# Patient Record
Sex: MALE | Race: OTHER | Employment: UNEMPLOYED
[De-identification: names, ages, dates, MRNs, and addresses within clinical notes are randomized per-mention and may not be internally consistent; named-entity substitution may affect disease eponyms.]

---

## 2017-01-25 ENCOUNTER — CARE COORDINATION (OUTPATIENT)
Dept: INTERNAL MEDICINE | Facility: CLINIC | Age: 77
End: 2017-01-25

## 2017-01-26 ENCOUNTER — CARE COORDINATION (OUTPATIENT)
Dept: CARE COORDINATION | Facility: CLINIC | Age: 77
End: 2017-01-26

## 2017-01-26 RX ORDER — AMIODARONE HYDROCHLORIDE 200 MG/1
200 TABLET ORAL DAILY
COMMUNITY
Start: 2017-01-25 | End: 2017-02-13 | Stop reason: SDUPTHER

## 2017-01-26 RX ORDER — ATORVASTATIN CALCIUM 20 MG/1
20 TABLET, FILM COATED ORAL DAILY
COMMUNITY
Start: 2017-01-25 | End: 2017-02-13 | Stop reason: SDUPTHER

## 2017-01-26 RX ORDER — POTASSIUM CHLORIDE 20 MEQ/1
20 TABLET, EXTENDED RELEASE ORAL DAILY
COMMUNITY
Start: 2017-01-25 | End: 2017-03-27 | Stop reason: SDUPTHER

## 2017-01-26 RX ORDER — FUROSEMIDE 40 MG/1
40 TABLET ORAL DAILY
COMMUNITY
Start: 2017-01-25 | End: 2017-02-22 | Stop reason: SDUPTHER

## 2017-01-26 RX ORDER — WARFARIN SODIUM 5 MG/1
5 TABLET ORAL DAILY
COMMUNITY

## 2017-01-26 RX ORDER — METOPROLOL TARTRATE 50 MG/1
50 TABLET, FILM COATED ORAL 2 TIMES DAILY
COMMUNITY
Start: 2017-01-25 | End: 2017-01-31 | Stop reason: DRUGHIGH

## 2017-01-26 RX ORDER — FAMOTIDINE 20 MG/1
20 TABLET, FILM COATED ORAL 2 TIMES DAILY
COMMUNITY
Start: 2017-01-25 | End: 2017-03-11 | Stop reason: SDUPTHER

## 2017-01-31 ENCOUNTER — OFFICE VISIT (OUTPATIENT)
Dept: INTERNAL MEDICINE | Facility: CLINIC | Age: 77
End: 2017-01-31

## 2017-01-31 VITALS
BODY MASS INDEX: 26.68 KG/M2 | WEIGHT: 170 LBS | DIASTOLIC BLOOD PRESSURE: 70 MMHG | SYSTOLIC BLOOD PRESSURE: 130 MMHG | RESPIRATION RATE: 14 BRPM | HEIGHT: 67 IN

## 2017-01-31 DIAGNOSIS — Z95.2 H/O AORTIC VALVE REPLACEMENT: ICD-10-CM

## 2017-01-31 DIAGNOSIS — E11.9 TYPE 2 DIABETES MELLITUS WITHOUT COMPLICATION, WITHOUT LONG-TERM CURRENT USE OF INSULIN (HCC): Primary | Chronic | ICD-10-CM

## 2017-01-31 DIAGNOSIS — E78.2 MIXED HYPERLIPIDEMIA: Chronic | ICD-10-CM

## 2017-01-31 DIAGNOSIS — Z95.1 S/P CABG X 3: ICD-10-CM

## 2017-01-31 PROCEDURE — 99496 TRANSJ CARE MGMT HIGH F2F 7D: CPT | Performed by: INTERNAL MEDICINE

## 2017-01-31 RX ORDER — POTASSIUM CHLORIDE 750 MG/1
TABLET, EXTENDED RELEASE ORAL
COMMUNITY
Start: 2017-01-13 | End: 2018-02-22 | Stop reason: SDUPTHER

## 2017-01-31 ASSESSMENT — ENCOUNTER SYMPTOMS
RESPIRATORY NEGATIVE: 1
SHORTNESS OF BREATH: 0
EYES NEGATIVE: 1
ALLERGIC/IMMUNOLOGIC NEGATIVE: 1
CHEST TIGHTNESS: 0

## 2017-02-13 RX ORDER — AMIODARONE HYDROCHLORIDE 200 MG/1
200 TABLET ORAL DAILY
Qty: 30 TABLET | Refills: 6 | Status: SHIPPED | OUTPATIENT
Start: 2017-02-13

## 2017-02-13 RX ORDER — ATORVASTATIN CALCIUM 20 MG/1
20 TABLET, FILM COATED ORAL DAILY
Qty: 30 TABLET | Refills: 6 | Status: SHIPPED | OUTPATIENT
Start: 2017-02-13

## 2017-02-22 RX ORDER — FUROSEMIDE 40 MG/1
TABLET ORAL
Qty: 30 TABLET | Refills: 0 | Status: SHIPPED | OUTPATIENT
Start: 2017-02-22 | End: 2017-03-19 | Stop reason: SDUPTHER

## 2017-03-13 RX ORDER — FAMOTIDINE 20 MG/1
TABLET, FILM COATED ORAL
Qty: 42 TABLET | Refills: 0 | Status: SHIPPED | OUTPATIENT
Start: 2017-03-13 | End: 2017-03-26 | Stop reason: SDUPTHER

## 2017-03-20 RX ORDER — FUROSEMIDE 40 MG/1
TABLET ORAL
Qty: 30 TABLET | Refills: 0 | Status: SHIPPED | OUTPATIENT
Start: 2017-03-20 | End: 2017-04-22 | Stop reason: SDUPTHER

## 2017-03-27 ENCOUNTER — OFFICE VISIT (OUTPATIENT)
Dept: INTERNAL MEDICINE CLINIC | Age: 77
End: 2017-03-27
Payer: COMMERCIAL

## 2017-03-27 VITALS
SYSTOLIC BLOOD PRESSURE: 130 MMHG | HEIGHT: 67 IN | RESPIRATION RATE: 14 BRPM | WEIGHT: 179 LBS | DIASTOLIC BLOOD PRESSURE: 68 MMHG | BODY MASS INDEX: 28.09 KG/M2

## 2017-03-27 DIAGNOSIS — Z88.8 ALLERGY TO ACE INHIBITORS: ICD-10-CM

## 2017-03-27 DIAGNOSIS — Z95.1 S/P CABG X 3: Primary | ICD-10-CM

## 2017-03-27 DIAGNOSIS — Z95.2 H/O AORTIC VALVE REPLACEMENT: ICD-10-CM

## 2017-03-27 DIAGNOSIS — E11.9 TYPE 2 DIABETES MELLITUS WITHOUT COMPLICATION, WITHOUT LONG-TERM CURRENT USE OF INSULIN (HCC): Chronic | ICD-10-CM

## 2017-03-27 DIAGNOSIS — R79.9 ABNORMAL FINDING OF BLOOD CHEMISTRY: ICD-10-CM

## 2017-03-27 PROCEDURE — 99214 OFFICE O/P EST MOD 30 MIN: CPT | Performed by: INTERNAL MEDICINE

## 2017-03-27 RX ORDER — POTASSIUM CHLORIDE 20 MEQ/1
20 TABLET, EXTENDED RELEASE ORAL DAILY
Qty: 60 TABLET | Refills: 3 | Status: SHIPPED | OUTPATIENT
Start: 2017-03-27 | End: 2018-02-22 | Stop reason: SDUPTHER

## 2017-03-27 RX ORDER — FAMOTIDINE 20 MG/1
TABLET, FILM COATED ORAL
Qty: 42 TABLET | Refills: 0 | Status: SHIPPED | OUTPATIENT
Start: 2017-03-27 | End: 2017-04-22 | Stop reason: SDUPTHER

## 2017-03-27 ASSESSMENT — ENCOUNTER SYMPTOMS
RESPIRATORY NEGATIVE: 1
ALLERGIC/IMMUNOLOGIC NEGATIVE: 1
CHEST TIGHTNESS: 0
EYES NEGATIVE: 1
SHORTNESS OF BREATH: 0

## 2017-04-03 LAB
ALBUMIN SERPL-MCNC: 4.4 G/DL
ALP BLD-CCNC: 53 U/L
ALT SERPL-CCNC: 18 U/L
AST SERPL-CCNC: 18 U/L
BASOPHILS ABSOLUTE: ABNORMAL /ΜL
BASOPHILS RELATIVE PERCENT: ABNORMAL %
BILIRUB SERPL-MCNC: 0.3 MG/DL (ref 0.1–1.4)
BUN BLDV-MCNC: 21 MG/DL
CALCIUM SERPL-MCNC: 9.9 MG/DL
CHLORIDE BLD-SCNC: 98 MMOL/L
CO2: 29 MMOL/L
CREAT SERPL-MCNC: 1.05 MG/DL
EOSINOPHILS ABSOLUTE: ABNORMAL /ΜL
EOSINOPHILS RELATIVE PERCENT: ABNORMAL %
GFR CALCULATED: NORMAL
GLUCOSE BLD-MCNC: 152 MG/DL
HBA1C MFR BLD: 6.4 %
HCT VFR BLD CALC: 39 % (ref 41–53)
HEMOGLOBIN: 13.2 G/DL (ref 13.5–17.5)
LYMPHOCYTES ABSOLUTE: ABNORMAL /ΜL
LYMPHOCYTES RELATIVE PERCENT: ABNORMAL %
MCH RBC QN AUTO: 29.2 PG
MCHC RBC AUTO-ENTMCNC: 33.9 G/DL
MCV RBC AUTO: 86 FL
MONOCYTES ABSOLUTE: ABNORMAL /ΜL
MONOCYTES RELATIVE PERCENT: ABNORMAL %
NEUTROPHILS ABSOLUTE: ABNORMAL /ΜL
NEUTROPHILS RELATIVE PERCENT: ABNORMAL %
PDW BLD-RTO: 15.6 %
PLATELET # BLD: 259 K/ΜL
PMV BLD AUTO: 9 FL
POTASSIUM SERPL-SCNC: 5.2 MMOL/L
RBC # BLD: 4.53 10^6/ΜL
SODIUM BLD-SCNC: 138 MMOL/L
TOTAL PROTEIN: 6.8
WBC # BLD: 8.6 10^3/ML

## 2017-04-06 DIAGNOSIS — E11.9 TYPE 2 DIABETES MELLITUS WITHOUT COMPLICATION, WITHOUT LONG-TERM CURRENT USE OF INSULIN (HCC): Chronic | ICD-10-CM

## 2017-04-06 DIAGNOSIS — R79.9 ABNORMAL FINDING OF BLOOD CHEMISTRY: ICD-10-CM

## 2017-04-06 DIAGNOSIS — Z95.1 S/P CABG X 3: ICD-10-CM

## 2017-04-24 RX ORDER — FAMOTIDINE 20 MG/1
TABLET, FILM COATED ORAL
Qty: 42 TABLET | Refills: 3 | Status: SHIPPED | OUTPATIENT
Start: 2017-04-24 | End: 2017-07-09 | Stop reason: SDUPTHER

## 2017-04-24 RX ORDER — FUROSEMIDE 40 MG/1
TABLET ORAL
Qty: 30 TABLET | Refills: 3 | Status: SHIPPED | OUTPATIENT
Start: 2017-04-24

## 2017-06-27 ENCOUNTER — TELEPHONE (OUTPATIENT)
Dept: INTERNAL MEDICINE CLINIC | Age: 77
End: 2017-06-27

## 2017-07-10 RX ORDER — FAMOTIDINE 20 MG/1
TABLET, FILM COATED ORAL
Qty: 14 TABLET | Refills: 0 | Status: SHIPPED | OUTPATIENT
Start: 2017-07-10

## 2018-02-07 ENCOUNTER — OFFICE VISIT (OUTPATIENT)
Dept: PODIATRY | Age: 78
End: 2018-02-07
Payer: COMMERCIAL

## 2018-02-07 VITALS
WEIGHT: 192 LBS | DIASTOLIC BLOOD PRESSURE: 85 MMHG | HEART RATE: 75 BPM | SYSTOLIC BLOOD PRESSURE: 138 MMHG | HEIGHT: 67 IN | BODY MASS INDEX: 30.13 KG/M2

## 2018-02-07 DIAGNOSIS — M77.52 LEFT CALCANEAL BURSITIS: Primary | ICD-10-CM

## 2018-02-07 DIAGNOSIS — M79.672 PAIN IN LEFT FOOT: ICD-10-CM

## 2018-02-07 DIAGNOSIS — M72.2 PLANTAR FASCIITIS OF LEFT FOOT: ICD-10-CM

## 2018-02-07 PROCEDURE — 99203 OFFICE O/P NEW LOW 30 MIN: CPT | Performed by: PODIATRIST

## 2018-02-07 RX ORDER — FUROSEMIDE 40 MG/1
TABLET ORAL
COMMUNITY
Start: 2018-01-07 | End: 2018-02-22 | Stop reason: SDUPTHER

## 2018-02-07 RX ORDER — FLUTICASONE PROPIONATE 50 MCG
SPRAY, SUSPENSION (ML) NASAL
COMMUNITY
Start: 2018-01-02

## 2018-02-07 RX ORDER — ATORVASTATIN CALCIUM 20 MG/1
TABLET, FILM COATED ORAL
COMMUNITY
Start: 2018-02-03 | End: 2018-02-22 | Stop reason: SDUPTHER

## 2018-02-07 RX ORDER — POLYETHYLENE GLYCOL 3350 17 G/17G
POWDER, FOR SOLUTION ORAL
COMMUNITY
Start: 2018-01-24

## 2018-02-07 RX ORDER — BACLOFEN 10 MG/1
10 TABLET ORAL 3 TIMES DAILY
COMMUNITY

## 2018-02-07 RX ORDER — FAMOTIDINE 20 MG/1
TABLET, FILM COATED ORAL
COMMUNITY
Start: 2018-01-14 | End: 2018-02-22 | Stop reason: SDUPTHER

## 2018-02-07 ASSESSMENT — ENCOUNTER SYMPTOMS
SHORTNESS OF BREATH: 0
COLOR CHANGE: 0
BACK PAIN: 0
DIARRHEA: 0
NAUSEA: 0

## 2018-02-07 NOTE — PROGRESS NOTES
warm, supple, and dry. Vascular: DP pulse of the right foot is  palpable. DP pulse of the left foot is  palpable. PT pulse of the right foot is  palpable. PT pulse of the left foot is  palpable. CFT is less than 3 secs to the digits of the right foot. CFT is less than 3 secs to the digits of the left foot. There is no edema noted to the bilateral foot or ankle. There is hair growth noted to the digits of the bilateral feet. There are varicosities noted to the right foot/ankle. There are varicosities noted to the left foot/ankle. Erythema is absent to the bilateral feet. Neurological: Reflexes are no present to the right plantar foot and to the Achilles tendon. Reflexes are no present to the left plantar foot and to the Achilles tendon. Protective sensation is present to the right plantar foot as noted with a 5.07 Damar-Caryl monofilament. Protective sensation is present to the left plantar foot as noted with a 5.07 Damar-Caryl monofilament. Musculoskeletal:  Muscle strength is +5/5 to all four muscle groups of the right lower extremity and +5/5 to all four muscle groups of the left lower extremity. There are no areas of subluxation, dislocation, or laxity noted to either lower extremity. Range of motion to the right ankle is  free of pain or grinding. Range of motion to the left ankle is  free of pain or grinding. Range of motion to the right subtalar joint is  free of pain or grinding. Range of motion to the left subtalar joint is  free of pain or grinding. No abnormalities, asymmetries, or misalignments are seen between the extremities. Weightbearing evaluation does not reveal rearfoot eversion, medial prominence of the talar head, loss of the medial longitudinal arch height, and too many toes sign bilaterally. The digits of the right foot are contracted. The digits of the left foot are contracted.      There is no prominence noted to the first metatarsal head without abduction of the hallux of the right foot. There is no prominence noted to the first metatarsal head without abduction of the hallux of the left foot. There is no pain with palpation to the plantar medial calcaneal tubercle of the left foot. There is only slight pain with palpation to the plantar central aspect of the left heel. There is no pain with palpation to the selma pedis of the left foot. There is no pain with medial to lateral compression of the left calcaneus. Tinel's sign is negative to the left tarsal tunnel. There is no erythema, calor, or open lesion noted to the left foot or ankle. Shoe examination was performed. Biomechanical Exam: normal bilaterally. X-ray's reviewed from the hospital: Lateral and calcaneal axial of the left foot. Findings: There is no fracture or stress fracture noted to the left calcaneus. There is a small bone spur noted to the plantar aspect of the calcaneus on the lateral view. Asessment: Patient is a 69 y.o. male with:   1. Left calcaneal bursitis     2. Plantar fasciitis of left foot     3. Pain in left foot         Plan:  1. Clinical evaluation of the patient. 2. Patient instructed on proper icing and stretching of the left foot. Patient advised to obtain over-the-counter gel heel inserts. Patient informed that if these modalities do not reduce his pain, then I would recommend a steroid injection. Patient states that he understands this. 3. Contact office with any questions/problems/concerns. Return in about 2 weeks (around 2/21/2018) for Evaluation of plantar fasciitis.    2/7/2018      Jesse Salmeron DPM

## 2018-02-22 ENCOUNTER — OFFICE VISIT (OUTPATIENT)
Dept: PODIATRY | Age: 78
End: 2018-02-22
Payer: COMMERCIAL

## 2018-02-22 VITALS
SYSTOLIC BLOOD PRESSURE: 142 MMHG | HEIGHT: 66 IN | BODY MASS INDEX: 30.53 KG/M2 | HEART RATE: 64 BPM | WEIGHT: 190 LBS | DIASTOLIC BLOOD PRESSURE: 83 MMHG

## 2018-02-22 DIAGNOSIS — M1A.0720 CHRONIC IDIOPATHIC GOUT INVOLVING TOE OF LEFT FOOT WITHOUT TOPHUS: ICD-10-CM

## 2018-02-22 DIAGNOSIS — M77.52 LEFT CALCANEAL BURSITIS: Primary | ICD-10-CM

## 2018-02-22 DIAGNOSIS — M79.672 PAIN IN LEFT FOOT: ICD-10-CM

## 2018-02-22 DIAGNOSIS — M72.2 PLANTAR FASCIITIS OF LEFT FOOT: ICD-10-CM

## 2018-02-22 PROCEDURE — 99213 OFFICE O/P EST LOW 20 MIN: CPT | Performed by: PODIATRIST

## 2018-02-22 ASSESSMENT — ENCOUNTER SYMPTOMS
SHORTNESS OF BREATH: 0
DIARRHEA: 0
COLOR CHANGE: 0
BACK PAIN: 0
NAUSEA: 0

## 2018-02-23 LAB — URIC ACID: 7.7

## 2018-03-05 ENCOUNTER — TELEPHONE (OUTPATIENT)
Dept: PODIATRY | Age: 78
End: 2018-03-05

## 2018-07-05 RX ORDER — POTASSIUM CHLORIDE 1500 MG/1
TABLET, EXTENDED RELEASE ORAL
Qty: 60 TABLET | Refills: 3 | OUTPATIENT
Start: 2018-07-05

## 2018-09-17 RX ORDER — POTASSIUM CHLORIDE 1500 MG/1
TABLET, EXTENDED RELEASE ORAL
Qty: 30 TABLET | Refills: 0 | OUTPATIENT
Start: 2018-09-17

## 2021-03-31 DIAGNOSIS — M25.562 CHRONIC PAIN OF BOTH KNEES: Primary | ICD-10-CM

## 2021-03-31 DIAGNOSIS — M25.561 CHRONIC PAIN OF BOTH KNEES: Primary | ICD-10-CM

## 2021-03-31 DIAGNOSIS — G89.29 CHRONIC PAIN OF BOTH KNEES: Primary | ICD-10-CM

## 2021-04-01 ENCOUNTER — OFFICE VISIT (OUTPATIENT)
Dept: ORTHOPEDIC SURGERY | Age: 81
End: 2021-04-01
Payer: COMMERCIAL

## 2021-04-01 VITALS — TEMPERATURE: 98 F

## 2021-04-01 DIAGNOSIS — M17.0 PRIMARY OSTEOARTHRITIS OF BOTH KNEES: ICD-10-CM

## 2021-04-01 DIAGNOSIS — M25.561 CHRONIC PAIN OF BOTH KNEES: Primary | ICD-10-CM

## 2021-04-01 DIAGNOSIS — G89.29 CHRONIC PAIN OF BOTH KNEES: Primary | ICD-10-CM

## 2021-04-01 DIAGNOSIS — M25.562 CHRONIC PAIN OF BOTH KNEES: Primary | ICD-10-CM

## 2021-04-01 PROCEDURE — 99203 OFFICE O/P NEW LOW 30 MIN: CPT | Performed by: ORTHOPAEDIC SURGERY

## 2021-04-01 PROCEDURE — 20610 DRAIN/INJ JOINT/BURSA W/O US: CPT | Performed by: ORTHOPAEDIC SURGERY

## 2021-04-01 RX ORDER — BETAMETHASONE SODIUM PHOSPHATE AND BETAMETHASONE ACETATE 3; 3 MG/ML; MG/ML
12 INJECTION, SUSPENSION INTRA-ARTICULAR; INTRALESIONAL; INTRAMUSCULAR; SOFT TISSUE ONCE
Status: COMPLETED | OUTPATIENT
Start: 2021-04-01 | End: 2021-04-01

## 2021-04-01 RX ORDER — LIDOCAINE HYDROCHLORIDE 10 MG/ML
2 INJECTION, SOLUTION INFILTRATION; PERINEURAL ONCE
Status: COMPLETED | OUTPATIENT
Start: 2021-04-01 | End: 2021-04-01

## 2021-04-01 RX ORDER — BUPIVACAINE HYDROCHLORIDE 5 MG/ML
2 INJECTION, SOLUTION PERINEURAL ONCE
Status: COMPLETED | OUTPATIENT
Start: 2021-04-01 | End: 2021-04-01

## 2021-04-01 RX ADMIN — BETAMETHASONE SODIUM PHOSPHATE AND BETAMETHASONE ACETATE 12 MG: 3; 3 INJECTION, SUSPENSION INTRA-ARTICULAR; INTRALESIONAL; INTRAMUSCULAR; SOFT TISSUE at 15:49

## 2021-04-01 RX ADMIN — BUPIVACAINE HYDROCHLORIDE 10 MG: 5 INJECTION, SOLUTION PERINEURAL at 15:50

## 2021-04-01 RX ADMIN — LIDOCAINE HYDROCHLORIDE 2 ML: 10 INJECTION, SOLUTION INFILTRATION; PERINEURAL at 15:50

## 2021-04-01 NOTE — PROGRESS NOTES
Patient ID: Valente Gates is a [de-identified] y.o. male    Chief Compliant:  No chief complaint on file. HPI:  This is a [de-identified] y.o. male who presents to the clinic today for bilateral knee evaluation. He was seen prior by Dr. Meaghan Francisco and was scheduled to have knee arthroplasty until he had heart problems    He reports significant knee pain bilaterally, worse on the right. He notes prior steroid injections in his knees provided some relief. He is diabetic. But only on oral medications. Review of Systems   All other systems reviewed and are negative. Past History:    Current Outpatient Medications:     fluticasone (FLONASE) 50 MCG/ACT nasal spray, , Disp: , Rfl:     polyethylene glycol (GLYCOLAX) powder, , Disp: , Rfl:     baclofen (LIORESAL) 10 MG tablet, Take 10 mg by mouth 3 times daily, Disp: , Rfl:     famotidine (PEPCID) 20 MG tablet, TAKE ONE TABLET BY MOUTH TWICE DAILY, Disp: 14 tablet, Rfl: 0    furosemide (LASIX) 40 MG tablet, TAKE ONE TABLET BY MOUTH ONCE DAILY, Disp: 30 tablet, Rfl: 3    atorvastatin (LIPITOR) 20 MG tablet, Take 1 tablet by mouth daily, Disp: 30 tablet, Rfl: 6    amiodarone (CORDARONE) 200 MG tablet, Take 1 tablet by mouth daily, Disp: 30 tablet, Rfl: 6    metoprolol tartrate (LOPRESSOR) 25 MG tablet, , Disp: , Rfl:     warfarin (COUMADIN) 5 MG tablet, Take 5 mg by mouth daily, Disp: , Rfl:     metFORMIN (GLUCOPHAGE) 500 MG tablet, Take 1 tablet by mouth 2 times daily (with meals), Disp: 180 tablet, Rfl: 3    aspirin 81 MG tablet, Take 1 tablet by mouth daily (with breakfast). , Disp: 90 tablet, Rfl: 3  Allergies   Allergen Reactions    Lisinopril Swelling    Lisinopril Swelling     Social History     Socioeconomic History    Marital status: Unknown     Spouse name: Not on file    Number of children: Not on file    Years of education: Not on file    Highest education level: Not on file   Occupational History    Not on file   Social Needs    Financial resource strain: Not on file    Food insecurity     Worry: Not on file     Inability: Not on file    Transportation needs     Medical: Not on file     Non-medical: Not on file   Tobacco Use    Smoking status: Former Smoker    Smokeless tobacco: Never Used   Substance and Sexual Activity    Alcohol use: No    Drug use: No    Sexual activity: Not on file   Lifestyle    Physical activity     Days per week: Not on file     Minutes per session: Not on file    Stress: Not on file   Relationships    Social connections     Talks on phone: Not on file     Gets together: Not on file     Attends Voodoo service: Not on file     Active member of club or organization: Not on file     Attends meetings of clubs or organizations: Not on file     Relationship status: Not on file    Intimate partner violence     Fear of current or ex partner: Not on file     Emotionally abused: Not on file     Physically abused: Not on file     Forced sexual activity: Not on file   Other Topics Concern    Not on file   Social History Narrative    ** Merged History Encounter **          Past Medical History:   Diagnosis Date    Cancer ([facility]     prostate    Diabetes ([facility]     Edema     Essential hypertension, benign     Gout     Heart burn     Herpes zoster without mention of complication     Hypertension     Neoplasm of uncertain behavior of prostate     Osteoarthritis     Pain in joint, lower leg     Psychogenic pain, site unspecified     Sprain of ribs     Type 2 diabetes mellitus ([address]     Ventral hernia, unspecified, without mention of obstruction or gangrene      Past Surgical History:   Procedure Laterality Date    CARDIAC SURGERY  01/05/2017    Triple bypass    CARDIAC VALVE REPLACEMENT  01/05/2017    HERNIA REPAIR      HERNIA REPAIR Bilateral     KNEE ARTHROSCOPY      KNEE ARTHROSCOPY Bilateral     PROSTATE SURGERY      VEIN SURGERY Left      Family History   Problem Relation Age of Onset    Arthritis Mother    [facility] Cancer Mother         leg    Cancer Father         prostate    Diabetes Brother     Arthritis Other     Diabetes Other         Physical Exam:  Vitals signs and nursing note reviewed. Constitutional:       Appearance: She is well-developed. HENT:      Head: Normocephalic and atraumatic. Nose: Nose normal.   Eyes:      Conjunctiva/sclera: Conjunctivae normal.   Neck:      Musculoskeletal: Normal range of motion and neck supple. Pulmonary:      Effort: Pulmonary effort is normal. No respiratory distress. Musculoskeletal:      Comments: Normal gait     Skin:     General: Skin is warm and dry. Neurological:      Mental Status: She is alert and oriented to person, place, and time. Sensory: No sensory deficit. Psychiatric:         Behavior: Behavior normal.         Thought Content: Thought content normal.    Physical exam reveals moderate varus deformity bilateral knees normal range of motion good stability        Diagnostic Imaging:    Standing AP bilateral knees lateral bilateral knees x-rays obtained reviewed by myself in clinic today patient with end-stage tricompartmental arthritis with significant endplate sclerosis marginal osteophyte formation and bone-on-bone arthritis    Assessment and Plan:  1. Chronic pain of both knees    2. Primary osteoarthritis of both knees        Patient would like to consider bilateral knee replacement next fall      Refer to Dr. Marianela Coker in 10 weeks for bilateral knee arthroplasty     Steroid injections administered to both knees    An informed verbal consent for the procedure was obtained and risks including, but not limited to: allergy to medications, injection, bleeding, stiffness of joint, recurrence of symptoms, loss of function, swelling, drainage, irrigation, need for surgery and pseudo-septic inflammation, were explained to the patient. Also, discussed was the potential for further injections, irrigation and debridement and surgery.  Alternate means of treatment have also been discussed with the patient. Administrations This Visit     betamethasone acetate-betamethasone sodium phosphate (CELESTONE) injection 12 mg     Admin Date  04/01/2021  15:49 Action  Given Dose  12 mg Route  Intra-articular Site  Knee Left Administered By  Katelin Noe LPN    Ordering Provider: Jeniffer Whalen MD    NDC: 8718-0973-67    Lot#: [id number]    : [id number]    Patient Supplied?: No    Admin Date  04/01/2021  15:49 Action  Given Dose  12 mg Route  Intra-articular Site  Knee Right Administered By  Katelin Noe LPN    Ordering Provider: Jeniffer Whalen MD    ND: 4703-6817-46    Lot#: [id number]    : [address]    Patient Supplied?: No          bupivacaine (MARCAINE) 0.5 % injection 10 mg     Admin Date  04/01/2021  15:50 Action  Given Dose  10 mg Route  Intra-articular Site  Knee Left Administered By  Katelin Noe LPN    Ordering Provider: Jeniffer Whalen MD    ND: 6184-6672-97    Lot#: [id number]    : HOSPIRA    Patient Supplied?: No    Admin Date  04/01/2021  15:50 Action  Given Dose  10 mg Route  Intra-articular Site  Knee Right Administered By  Katelin Noe LPN    Ordering Provider: Jeniffer Whalen MD    NDC: 5294-2539-29    Lot#: [id number]    : [address]    Patient Supplied?: No          lidocaine 1 % injection 2 mL     Admin Date  04/01/2021  15:50 Action  Given Dose  2 mL Route  Intra-articular Site  Knee Left Administered By  Katelin Noe LPN    Ordering Provider: Jeniffer Whalen MD    [address]: 1613-8780-98    Lot#: [id number]    : Nitza Rockwell    Patient Supplied?: No    Admin Date  04/01/2021  15:50 Action  Given Dose  2 mL Route  Intra-articular Site  Knee Right Administered By  Katelin Noe LPN    Ordering Provider: Jeniffer Whalen MD    NDC: 6819-0253-93    Lot#: [id number]    : FZGCQ    Patient Supplied?: No                  Provider Attestation:  Rosalinda Aguirre, personally performed the

## 2021-08-16 ENCOUNTER — OFFICE VISIT (OUTPATIENT)
Dept: ORTHOPEDIC SURGERY | Age: 81
End: 2021-08-16
Payer: COMMERCIAL

## 2021-08-16 VITALS — WEIGHT: 190 LBS | BODY MASS INDEX: 30.53 KG/M2 | HEIGHT: 66 IN | RESPIRATION RATE: 18 BRPM

## 2021-08-16 DIAGNOSIS — G89.29 CHRONIC PAIN OF BOTH KNEES: Primary | ICD-10-CM

## 2021-08-16 DIAGNOSIS — M25.561 CHRONIC PAIN OF BOTH KNEES: Primary | ICD-10-CM

## 2021-08-16 DIAGNOSIS — M25.562 CHRONIC PAIN OF BOTH KNEES: Primary | ICD-10-CM

## 2021-08-16 PROCEDURE — 99213 OFFICE O/P EST LOW 20 MIN: CPT | Performed by: ORTHOPAEDIC SURGERY

## 2021-08-16 NOTE — PROGRESS NOTES
Osiris Clark M.D.            [address] [facility] [address], [facility].           Dept Phone: [phone]           Dept Fax:  [address], [place]          Dept Phone: [phone]           Dept Fax:  [phone]      Chief Compliant:  Chief Complaint   Patient presents with    Knee Pain     bilateral        History of Present Illness: This is a [de-identified] y.o. male who presents to the clinic today for evaluation / follow up of severe right knee pain. Patient is an 71-year-old gentleman who is been followed by me for many years and most recently saw Dr. Britni Steele on April 1 of this year. Patient has had numerous rounds of anti-inflammatories as well as injections. Patient most recent visit of Dr. Britni Steele on April 1 2 had revealed new x-rays which show severe degenerative joint disease of both knees right greater than left. Patient states that he is at the point where his actives of daily living become significantly restricted he has difficulty walking getting out of chairs ascending descending stairs etc.  He is here to discuss total knee arthroplasty. .       Review of Systems   Constitutional: Negative for fever, chills, sweats. Eyes: Negative for changes in vision, or pain. HENT: Negative for ear ache, epistaxis, or sore throat. Respiratory/Cardio: Negative for Chest pain, palpitations, SOB, or cough. Gastrointestinal: Negative for abdominal pain, N/V/D. Genitourinary: Negative for dysuria, frequency, urgency, or hematuria. Neurological: Negative for headache, numbness, or weakness. Integumentary: Negative for rash, itching, laceration, or abrasion. Musculoskeletal: Positive for Knee Pain (bilateral)       Physical Exam:  Constitutional: Patient is oriented to person, place, and time.  Patient appears well-developed and well of Current or Ex-Partner:     Emotionally Abused:     Physically Abused:     Sexually Abused:      Past Medical History:   Diagnosis Date    Cancer ([facility]     prostate    Diabetes ([facility] [facility]     Edema     Essential hypertension, benign     Gout     Heart burn     Herpes zoster without mention of complication     Hypertension     Neoplasm of uncertain behavior of prostate     Osteoarthritis     Pain in joint, lower leg     Psychogenic pain, site unspecified     Sprain of ribs     Type 2 diabetes mellitus ([facility]     Ventral hernia, unspecified, without mention of obstruction or gangrene      Past Surgical History:   Procedure Laterality Date    CARDIAC SURGERY  01/05/2017    Triple bypass    CARDIAC VALVE REPLACEMENT  01/05/2017    HERNIA REPAIR      HERNIA REPAIR Bilateral     KNEE ARTHROSCOPY      KNEE ARTHROSCOPY Bilateral     PROSTATE SURGERY      VEIN SURGERY Left      Family History   Problem Relation Age of Onset    Arthritis Mother     Cancer Mother         leg    Cancer Father         prostate    Diabetes Brother     Arthritis Other     Diabetes Other    Plan  Patient was advised that he is in the need of total knee arthroplasty. He has been followed by me since December 2015 and has exhausted conservative measures. He is at a point where his activities of become significantly restricted. He was counseled regarding total knee arthroplasty as well as risk and benefits. Patient does have a history of a coronary artery bypass graft surgery as well as a valve replacement and does see cardiology but only is on aspirin. Cardiology clearance will be necessary. We will get the patient scheduled accordingly      Provider Attestation:  Becky Carias, personally performed the services described in this documentation. All medical record entries made by the scribe were at my direction and in my presence.  I have reviewed the chart and discharge instructions and agree that the records reflect my personal performance and is accurate and complete. Citlaly Luciano MD. 08/16/21      Please note that this chart was generated using voice recognition Dragon dictation software. Although every effort was made to ensure the accuracy of this automated transcription, some errors in transcription may have occurred.

## 2021-10-04 ENCOUNTER — TELEPHONE (OUTPATIENT)
Dept: ORTHOPEDIC SURGERY | Age: 81
End: 2021-10-04

## 2021-10-04 NOTE — TELEPHONE ENCOUNTER
Patient son Boy Miller) is calling in with a few questions. He is not on HIM Communication Form, so when we call him back, we will need to do a three way call for patient to give us permission to speak with son Boy Miller). Kaylee Delaney will have his father sign a new communication form at his next visit. Patient is scheduled for Rt TKA on 10/21/21    Questions:    1. Will patient be discharged to Home? Or Rehab? 2. If Rehab,  How long will he be in Rehab? Marcella is trying to plan on coming home to assist the patient after surgery and trying to see if he is needed right after surgery or a few weeks later (due to rehab stay).     Please advise:

## 2021-10-04 NOTE — TELEPHONE ENCOUNTER
Called Sonali Heredia (Son) back and tried to reach the patient, but it was his daughter instead. So Sonali Heredia reached out to his father and the three of us talked. Patient gave his permission to talk to his son Saida Blue). I answered all their questions.

## 2021-10-08 ENCOUNTER — TELEPHONE (OUTPATIENT)
Dept: ORTHOPEDIC SURGERY | Age: 81
End: 2021-10-08

## 2021-10-08 NOTE — TELEPHONE ENCOUNTER
Maulik Chen from [facility] called to let you know she faxed over patients lab results. She noticed HGA1C is elevated     Michelle ph. [phone]    Please keep an eye out for fax if any questions you can call Maulik Chen.

## 2021-10-22 ENCOUNTER — TELEPHONE (OUTPATIENT)
Dept: ORTHOPEDIC SURGERY | Age: 81
End: 2021-10-22

## 2021-10-22 NOTE — TELEPHONE ENCOUNTER
Pt had Rt TKA on 10/21/21. Jazzy, home health aid, called in stating that the pt is not functioning properly and very confused. Also said that pain pump came apart and asked if it goes back together. She was advised to try to put it back together to see if it reconnects. As far as the confusion, she was instructed to have the family take him to the ER if they have concerns for a stroke or other medical emergency that could be causing his confusion. They should also be seen in the ER if they have other concerns that they feel cannot wait until Monday to be addressed by Dr. Rachel Oliva.

## 2021-10-26 ENCOUNTER — TELEPHONE (OUTPATIENT)
Dept: ORTHOPEDIC SURGERY | Age: 81
End: 2021-10-26

## 2021-10-26 NOTE — TELEPHONE ENCOUNTER
Patient's son, Cherelle Joy, is calling to request a phone call from clinical staff. He states that his dad just had surgery with Dr Blaire Valadez last Thursday and he did really good over the weekend. However, he's now having a lot of serious swelling and pain from his hip area all the way down to the foot. Cherelle Joy is requesting a call from clinical staff to discuss his condition and what steps they should take next.   Please advise

## 2021-10-26 NOTE — TELEPHONE ENCOUNTER
Patient's son Chrao Hays called back in requesting a call for advisement as the pain and swelling has worsened just within today. Please advise and contact patient thank you!  Charo Hays can be reached at [phone]

## 2021-10-26 NOTE — TELEPHONE ENCOUNTER
Viri Richard, patient's son called in with regards to the issue with increase pain and swelling since this morning. I was able to talk directly with patient via speaker phone. Patient stated that he has only taken one percocet for pain over the last couple of days. I recommended that the patient resume taking the percocet every 6 hours and to supplement with ibu between percocet doses. I also suggested that the patient resume wearing compression stockings, elevating leg(s) & icing of the affect knee. Patient states that the compression stockings are too small and the family was going to look into purchasing a bigger size. Instructed both individuals to call the office asap if the swelling and pain persist or worsen after a couple days of attempting the recommendations that we discussed above.

## 2021-11-03 ENCOUNTER — OFFICE VISIT (OUTPATIENT)
Dept: ORTHOPEDIC SURGERY | Age: 81
End: 2021-11-03

## 2021-11-03 VITALS — HEIGHT: 66 IN | BODY MASS INDEX: 30.53 KG/M2 | WEIGHT: 190 LBS | RESPIRATION RATE: 18 BRPM

## 2021-11-03 DIAGNOSIS — Z96.651 STATUS POST TOTAL RIGHT KNEE REPLACEMENT: Primary | ICD-10-CM

## 2021-11-03 PROCEDURE — 99024 POSTOP FOLLOW-UP VISIT: CPT | Performed by: ORTHOPAEDIC SURGERY

## 2021-11-03 RX ORDER — TRAMADOL HYDROCHLORIDE 50 MG/1
50 TABLET ORAL EVERY 4 HOURS PRN
Qty: 42 TABLET | Refills: 0 | Status: SHIPPED | OUTPATIENT
Start: 2021-11-03 | End: 2021-11-10

## 2021-11-03 NOTE — PROGRESS NOTES
Helen Busby M.D.            [address] [facility] [address] [facility] [address],[address], [facility].           Dept Phone: [phone]           Dept Fax:  [address]           [place]          Dept Phone: [phone]           Dept Fax:  [phone]      Chief Compliant:  Chief Complaint   Patient presents with    Post-Op Check        History of Present Illness:  Patient returns today status post right TKA times 2 weeks. Patient has no major complaints     Review of Systems   Constitutional: Negative for fever, chills, sweats, recent injury, recent illness  Neurological: Negative for Headaches, numbness, or weakness. Integumentary: Negative for rash, itching, ecchymosis, or wounds. Musculoskeletal: Positive for Post-Op Check       Physical Exam:  Constitutional: Patient is oriented to person, place, and time. Patient appears well-developed and well nourished. Musculoskeletal: Normal gait. Motion 0-100 degrees with expected pain with ROM. No Calf tenderness, Negative Javan's sign. Neurovascular intact. Neurological: Patient is alert and oriented to person, place, and time. Normal strenght. No sensory deficit. Skin: Skin is warm and dry. Incision is healing well without signs of redness or drainage  Nursing note and vitals reviewed. Labs and Imaging:     XR taken today:  XR KNEE RIGHT (1-2 VIEWS)    Result Date: 11/3/2021  Rays taken they reviewed by me standing AP both knees and lateral the right knee. Patient status post right total knee arthroplasty. Components are in excellent position on both AP and lateral views.   Patient is joint line on the medial side is basically bone-on-bone         Orders Placed This Encounter   Procedures    XR KNEE RIGHT (1-2 VIEWS)     Standing Status:   Future     Number of Occurrences:   1     Standing Expiration Date: 11/2/2022    External Referral To Physical Therapy     Referral Priority:   Routine     Referral Type:   Eval and Treat     Referral Reason:   Specialty Services Required     Referred to Provider:   Norm Delgadillo     Requested Specialty:   Physical Therapy     Number of Visits Requested:   1       Assessment and Plan:  1. Status post total right knee replacement        2 weeks status post right TKA        This is a [de-identified] y.o. male who presents to the clinic today status post right TKA. Continue anticoagulation. Transition to outpatient Pt. Restrictions given. RTO 5-6 weeks. Call if any problems/issues prior to that       Provider Attestation:  Nuris Dupont, personally performed the services described in this documentation. All medical record entries made by the scribe were at my direction and in my presence. I have reviewed the chart and discharge instructions and agree that the records reflect my personal performance and is accurate and complete. Luzmaria Malin MD. 11/03/21        Please note that this chart was generated using voice recognition Dragon dictation software. Although every effort was made to ensure the accuracy of this automated transcription, some errors in transcription may have occurred.

## 2021-11-04 ENCOUNTER — TELEPHONE (OUTPATIENT)
Dept: ORTHOPEDIC SURGERY | Age: 81
End: 2021-11-04

## 2021-11-05 ENCOUNTER — TELEPHONE (OUTPATIENT)
Dept: ORTHOPEDIC SURGERY | Age: 81
End: 2021-11-05

## 2021-11-05 NOTE — TELEPHONE ENCOUNTER
Adwoa Breaux from [facility] is asking for a signed visit note from patients visit on 11/3/21. Thank you. Please fax to:   Roxanne Anaya  [phone]

## 2021-11-05 NOTE — TELEPHONE ENCOUNTER
I spoke with patients daughter she stated patient is going to PT at [facility], they are in his network. I notified Ellie Keane at [address] [facility] to update.

## 2021-12-15 ENCOUNTER — OFFICE VISIT (OUTPATIENT)
Dept: ORTHOPEDIC SURGERY | Age: 81
End: 2021-12-15

## 2021-12-15 DIAGNOSIS — Z96.651 STATUS POST TOTAL RIGHT KNEE REPLACEMENT: Primary | ICD-10-CM

## 2021-12-15 PROCEDURE — 99024 POSTOP FOLLOW-UP VISIT: CPT | Performed by: ORTHOPAEDIC SURGERY

## 2021-12-15 NOTE — PROGRESS NOTES
Patient returns today status post right total knee on 10/21/2021. Patient states that his knee pain is not bad as was before surgery but he says he feels a pinching tightening pain in his knee. Denies any fever chills denies any other adverse effects. Examination of his right knee notes his wound is pristine. There is no redness or warmth. His motion however is 3 to about 85 degrees he is really tight after that.   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

## 2022-02-09 ENCOUNTER — OFFICE VISIT (OUTPATIENT)
Dept: ORTHOPEDIC SURGERY | Age: 82
End: 2022-02-09

## 2022-02-09 DIAGNOSIS — Z96.651 STATUS POST TOTAL RIGHT KNEE REPLACEMENT: Primary | ICD-10-CM

## 2022-02-09 PROCEDURE — 99024 POSTOP FOLLOW-UP VISIT: CPT | Performed by: ORTHOPAEDIC SURGERY

## 2022-02-09 NOTE — PROGRESS NOTES
Patient returns today. He status post right total knee with subsequent manipulation under anesthesia right total knee on 1/27/2022. He states that his knee definitely feels much better and looser. He states he is only got 1 more therapy session however. Examination of his knee notes he gets full extension. I can flex him pretty easily to about 100 205 degrees which is a big improvement for him. Still little bit tight trying to get flex more flexion of this but he states overall his knee pain is much improved. Impression  Status post right total knee subsequent manipulation under anesthesia. Plan  Patient was encouraged to continue exercises.  He preferred to do this on his own rather going to therapy as a inconvenient for him to get rides we will see him back here in 2 months

## 2022-04-13 ENCOUNTER — OFFICE VISIT (OUTPATIENT)
Dept: ORTHOPEDIC SURGERY | Age: 82
End: 2022-04-13

## 2022-04-13 VITALS — BODY MASS INDEX: 30.53 KG/M2 | RESPIRATION RATE: 14 BRPM | WEIGHT: 190 LBS | HEIGHT: 66 IN

## 2022-04-13 DIAGNOSIS — Z96.651 STATUS POST TOTAL RIGHT KNEE REPLACEMENT: Primary | ICD-10-CM

## 2022-04-13 PROCEDURE — 99024 POSTOP FOLLOW-UP VISIT: CPT | Performed by: ORTHOPAEDIC SURGERY

## 2022-04-13 NOTE — PROGRESS NOTES
Patient returns today status post right total knee. He had a subsequent manipulation on January 27, 2022. He states that overall his knee pain is much improved. Occasionally gets a burning sensation on the inferior aspect of his incision but otherwise he is get around much better than he was prior to surgery. Examination of the patient's knee notes he has well-healed scar he is able to get full extension he still little bit tight in flexion I can get him back to but 105 before he starts getting really tight he has no pain with this however has good stability good patellar tracking    No new x-rays taken today    Impression  Status post a right total knee October 21 with subsequent manipulation January 22    Plan  Patient encouraged to continue working on his exercises and stretching.   Overall he is very pleased and will see him back here in October for his first year follow-up or call if problems prior to that time

## 2022-10-12 ENCOUNTER — OFFICE VISIT (OUTPATIENT)
Dept: ORTHOPEDIC SURGERY | Age: 82
End: 2022-10-12
Payer: COMMERCIAL

## 2022-10-12 DIAGNOSIS — M25.561 RIGHT KNEE PAIN, UNSPECIFIED CHRONICITY: Primary | ICD-10-CM

## 2022-10-12 PROCEDURE — 99213 OFFICE O/P EST LOW 20 MIN: CPT | Performed by: ORTHOPAEDIC SURGERY

## 2022-10-12 PROCEDURE — 1123F ACP DISCUSS/DSCN MKR DOCD: CPT | Performed by: ORTHOPAEDIC SURGERY

## 2022-10-12 NOTE — PROGRESS NOTES
Chris Louis M.D.            [address] [facility] [address] [facility],[address], [address] [facility]           Dept Phone: [phone]           Dept Fax:  [address], [place]          Dept Phone: [phone]           Dept Fax:  [phone]      Chief Compliant:  Chief Complaint   Patient presents with    Knee Pain     right        History of Present Illness: This is a 81 y.o. male who presents to the clinic today for evaluation / follow up of 1 year status post right total knee. Patient did have a subsequent manipulation of his knee in January. He states overall his knee is doing well he has minimal pain in his left knee started about a little bit but not nearly as severe. Review of Systems   Constitutional: Negative for fever, chills, sweats. Eyes: Negative for changes in vision, or pain. HENT: Negative for ear ache, epistaxis, or sore throat. Respiratory/Cardio: Negative for Chest pain, palpitations, SOB, or cough. Gastrointestinal: Negative for abdominal pain, N/V/D. Genitourinary: Negative for dysuria, frequency, urgency, or hematuria. Neurological: Negative for headache, numbness, or weakness. Integumentary: Negative for rash, itching, laceration, or abrasion. Musculoskeletal: Positive for Knee Pain (right)       Physical Exam:  Constitutional: Patient is oriented to person, place, and time. Patient appears well-developed and well nourished. HENT: Negative otherwise noted  Head: Normocephalic and Atraumatic  Nose: Normal  Eyes: Conjunctivae and EOM are normal  Neck: Normal range of motion Neck supple. Respiratory/Cardio: Effort normal. No respiratory distress. Musculoskeletal: Physical examination the patient's right knee notes her full extension flexion to about 110 degrees he gets tight after that.   He has good patellar tracking however good stability. Neurological: Patient is alert and oriented to person, place, and time. Normal strength. No sensory deficit. Skin: Skin is warm and dry  Psychiatric: Behavior is normal. Thought content normal.  Nursing note and vitals reviewed. Labs and Imaging:     XR taken today:  XR KNEE RIGHT (1-2 VIEWS)    Result Date: 10/12/2022  X-rays taken today reviewed by me show standing AP both knees and lateral the right knee. Patient is status post right total knee arthroplasty components. Good alignment and position on AP and lateral views. Patellar height is appropriate no interval change since previous x-rays. Patient's left knee notes marked medial joint space narrowing. No acute process is noted          Orders Placed This Encounter   Procedures    XR KNEE RIGHT (1-2 VIEWS)     Standing Status:   Future     Number of Occurrences:   1     Standing Expiration Date:   10/12/2023       Assessment and Plan:  1 year status post right total knee subsequent manipulation doing well            This is a 81 y.o. male who presents to the clinic today for evaluation / follow up of 1 year status post right total knee with subsequent manipulation doing relatively well.      Past History:    Current Outpatient Medications:     fluticasone (FLONASE) 50 MCG/ACT nasal spray, , Disp: , Rfl:     polyethylene glycol (GLYCOLAX) powder, , Disp: , Rfl:     baclofen (LIORESAL) 10 MG tablet, Take 10 mg by mouth 3 times daily (Patient not taking: Reported on 8/16/2021), Disp: , Rfl:     famotidine (PEPCID) 20 MG tablet, TAKE ONE TABLET BY MOUTH TWICE DAILY, Disp: 14 tablet, Rfl: 0    furosemide (LASIX) 40 MG tablet, TAKE ONE TABLET BY MOUTH ONCE DAILY, Disp: 30 tablet, Rfl: 3    atorvastatin (LIPITOR) 20 MG tablet, Take 1 tablet by mouth daily, Disp: 30 tablet, Rfl: 6    amiodarone (CORDARONE) 200 MG tablet, Take 1 tablet by mouth daily, Disp: 30 tablet, Rfl: 6    metoprolol tartrate (LOPRESSOR) 25 MG tablet, , Disp: , Rfl:     warfarin (COUMADIN) 5 MG tablet, Take 5 mg by mouth daily, Disp: , Rfl:     metFORMIN (GLUCOPHAGE) 500 MG tablet, Take 1 tablet by mouth 2 times daily (with meals), Disp: 180 tablet, Rfl: 3    aspirin 81 MG tablet, Take 1 tablet by mouth daily (with breakfast). , Disp: 90 tablet, Rfl: 3  Allergies   Allergen Reactions    Lisinopril Swelling    Lisinopril Swelling    Amlodipine      Social History     Socioeconomic History    Marital status: Unknown     Spouse name: Not on file    Number of children: Not on file    Years of education: Not on file    Highest education level: Not on file   Occupational History    Not on file   Tobacco Use    Smoking status: Former    Smokeless tobacco: Never   Substance and Sexual Activity    Alcohol use: No    Drug use: No    Sexual activity: Not on file   Other Topics Concern    Not on file   Social History Narrative    ** Merged History Encounter **          Social Determinants of Health     Financial Resource Strain: Not on file   Food Insecurity: Not on file   Transportation Needs: Not on file   Physical Activity: Not on file   Stress: Not on file   Social Connections: Not on file   Intimate Partner Violence: Not on file   Housing Stability: Not on file     Past Medical History:   Diagnosis Date    Cancer ([facility] [address]     prostate    Diabetes ([facility] [address]     Edema     Essential hypertension, benign     Gout     Heart burn     Herpes zoster without mention of complication     Hypertension     Neoplasm of uncertain behavior of prostate     Osteoarthritis     Pain in joint, lower leg     Psychogenic pain, site unspecified     Sprain of ribs     Type 2 diabetes mellitus ([facility] [address]     Ventral hernia, unspecified, without mention of obstruction or gangrene      Past Surgical History:   Procedure Laterality Date    CARDIAC SURGERY  01/05/2017    Triple bypass    CARDIAC VALVE REPLACEMENT  01/05/2017    HERNIA REPAIR      HERNIA REPAIR Bilateral     KNEE ARTHROSCOPY      KNEE ARTHROSCOPY Bilateral     PROSTATE SURGERY      VEIN SURGERY Left      Family History   Problem Relation Age of Onset    Arthritis Mother     Cancer Mother         leg    Cancer Father         prostate    Diabetes Brother     Arthritis Other     Diabetes Other    Plan  Patient is doing well overall very pleased. She is complains of typical start up pain coming out of a chair but when she is up and going the stiffness is better. We will see him back here in 1 years time encouraged him to continue his exercises in the interim. Provider Attestation:  Jonathan Callahan, personally performed the services described in this documentation. All medical record entries made by the scribe were at my direction and in my presence. I have reviewed the chart and discharge instructions and agree that the records reflect my personal performance and is accurate and complete. Rosalino Cedeño MD. 10/12/22      Please note that this chart was generated using voice recognition Dragon dictation software. Although every effort was made to ensure the accuracy of this automated transcription, some errors in transcription may have occurred.

## 2022-10-24 ENCOUNTER — APPOINTMENT (OUTPATIENT)
Dept: CT IMAGING | Age: 82
DRG: 948 | End: 2022-10-24
Payer: COMMERCIAL

## 2022-10-24 ENCOUNTER — APPOINTMENT (OUTPATIENT)
Dept: GENERAL RADIOLOGY | Age: 82
DRG: 948 | End: 2022-10-24
Payer: COMMERCIAL

## 2022-10-24 ENCOUNTER — HOSPITAL ENCOUNTER (EMERGENCY)
Age: 82
Discharge: LEFT BEFORE DISCHARGE | DRG: 948 | End: 2022-10-24
Attending: EMERGENCY MEDICINE | Admitting: FAMILY MEDICINE
Payer: COMMERCIAL

## 2022-10-24 ENCOUNTER — APPOINTMENT (OUTPATIENT)
Dept: MRI IMAGING | Age: 82
DRG: 948 | End: 2022-10-24
Payer: COMMERCIAL

## 2022-10-24 VITALS
SYSTOLIC BLOOD PRESSURE: 154 MMHG | WEIGHT: 190 LBS | RESPIRATION RATE: 16 BRPM | HEART RATE: 72 BPM | HEIGHT: 66 IN | TEMPERATURE: 98.6 F | BODY MASS INDEX: 30.53 KG/M2 | DIASTOLIC BLOOD PRESSURE: 80 MMHG | OXYGEN SATURATION: 99 %

## 2022-10-24 DIAGNOSIS — R74.01 TRANSAMINITIS: Primary | ICD-10-CM

## 2022-10-24 PROBLEM — E80.6 HYPERBILIRUBINEMIA: Status: ACTIVE | Noted: 2022-10-24

## 2022-10-24 PROBLEM — R79.89 ELEVATED LFTS: Status: ACTIVE | Noted: 2022-10-24

## 2022-10-24 PROBLEM — I50.32 CHRONIC DIASTOLIC CONGESTIVE HEART FAILURE (HCC): Status: ACTIVE | Noted: 2022-10-24

## 2022-10-24 PROBLEM — I25.10 ASCVD (ARTERIOSCLEROTIC CARDIOVASCULAR DISEASE): Status: ACTIVE | Noted: 2022-10-24

## 2022-10-24 PROBLEM — Z79.01 LONG TERM (CURRENT) USE OF ANTICOAGULANTS: Status: ACTIVE | Noted: 2022-10-24

## 2022-10-24 PROBLEM — I48.0 PAF (PAROXYSMAL ATRIAL FIBRILLATION) (HCC): Status: ACTIVE | Noted: 2022-10-24

## 2022-10-24 PROBLEM — K80.70 CALCULUS OF GALLBLADDER AND BILE DUCT: Status: ACTIVE | Noted: 2022-10-24

## 2022-10-24 LAB
ABSOLUTE EOS #: 0.1 K/UL (ref 0–0.4)
ABSOLUTE LYMPH #: 1.9 K/UL (ref 1–4.8)
ABSOLUTE MONO #: 0.6 K/UL (ref 0.1–1.3)
ALBUMIN SERPL-MCNC: 4.3 G/DL (ref 3.5–5.2)
ALP BLD-CCNC: 361 U/L (ref 40–129)
ALT SERPL-CCNC: 350 U/L (ref 5–41)
ANION GAP SERPL CALCULATED.3IONS-SCNC: 14 MMOL/L (ref 9–17)
AST SERPL-CCNC: 75 U/L
BASOPHILS # BLD: 1 % (ref 0–2)
BASOPHILS ABSOLUTE: 0 K/UL (ref 0–0.2)
BILIRUB SERPL-MCNC: 2.3 MG/DL (ref 0.3–1.2)
BILIRUBIN URINE: NEGATIVE
BUN BLDV-MCNC: 34 MG/DL (ref 8–23)
CALCIUM SERPL-MCNC: 10.1 MG/DL (ref 8.6–10.4)
CHLORIDE BLD-SCNC: 95 MMOL/L (ref 98–107)
CO2: 27 MMOL/L (ref 20–31)
COLOR: YELLOW
COMMENT UA: NORMAL
CREAT SERPL-MCNC: 1.21 MG/DL (ref 0.7–1.2)
EOSINOPHILS RELATIVE PERCENT: 1 % (ref 0–4)
GFR SERPL CREATININE-BSD FRML MDRD: >60 ML/MIN/1.73M2
GLUCOSE BLD-MCNC: 172 MG/DL (ref 70–99)
GLUCOSE URINE: NEGATIVE
HCT VFR BLD CALC: 38.9 % (ref 41–53)
HEMOGLOBIN: 12.7 G/DL (ref 13.5–17.5)
KETONES, URINE: NEGATIVE
LEUKOCYTE ESTERASE, URINE: NEGATIVE
LIPASE: 167 U/L (ref 13–60)
LYMPHOCYTES # BLD: 29 % (ref 24–44)
MAGNESIUM: 1.8 MG/DL (ref 1.6–2.6)
MCH RBC QN AUTO: 29 PG (ref 26–34)
MCHC RBC AUTO-ENTMCNC: 32.8 G/DL (ref 31–37)
MCV RBC AUTO: 88.5 FL (ref 80–100)
MONOCYTES # BLD: 9 % (ref 1–7)
NITRITE, URINE: NEGATIVE
PDW BLD-RTO: 14.9 % (ref 11.5–14.9)
PH UA: 5 (ref 5–8)
PLATELET # BLD: 280 K/UL (ref 150–450)
PMV BLD AUTO: 7.7 FL (ref 6–12)
POTASSIUM SERPL-SCNC: 4.1 MMOL/L (ref 3.7–5.3)
PROTEIN UA: NEGATIVE
RBC # BLD: 4.4 M/UL (ref 4.5–5.9)
SEG NEUTROPHILS: 60 % (ref 36–66)
SEGMENTED NEUTROPHILS ABSOLUTE COUNT: 3.8 K/UL (ref 1.3–9.1)
SODIUM BLD-SCNC: 136 MMOL/L (ref 135–144)
SPECIFIC GRAVITY UA: 1.01 (ref 1–1.03)
TOTAL PROTEIN: 7.5 G/DL (ref 6.4–8.3)
TROPONIN, HIGH SENSITIVITY: 23 NG/L (ref 0–22)
TURBIDITY: CLEAR
URINE HGB: NEGATIVE
UROBILINOGEN, URINE: NORMAL
WBC # BLD: 6.4 K/UL (ref 3.5–11)

## 2022-10-24 PROCEDURE — 71045 X-RAY EXAM CHEST 1 VIEW: CPT

## 2022-10-24 PROCEDURE — 83735 ASSAY OF MAGNESIUM: CPT

## 2022-10-24 PROCEDURE — 81003 URINALYSIS AUTO W/O SCOPE: CPT

## 2022-10-24 PROCEDURE — 80053 COMPREHEN METABOLIC PANEL: CPT

## 2022-10-24 PROCEDURE — 93005 ELECTROCARDIOGRAM TRACING: CPT | Performed by: EMERGENCY MEDICINE

## 2022-10-24 PROCEDURE — 99285 EMERGENCY DEPT VISIT HI MDM: CPT

## 2022-10-24 PROCEDURE — 84484 ASSAY OF TROPONIN QUANT: CPT

## 2022-10-24 PROCEDURE — 83690 ASSAY OF LIPASE: CPT

## 2022-10-24 PROCEDURE — 74176 CT ABD & PELVIS W/O CONTRAST: CPT

## 2022-10-24 PROCEDURE — 1200000000 HC SEMI PRIVATE

## 2022-10-24 PROCEDURE — 85025 COMPLETE CBC W/AUTO DIFF WBC: CPT

## 2022-10-24 PROCEDURE — 36415 COLL VENOUS BLD VENIPUNCTURE: CPT

## 2022-10-24 RX ORDER — FENTANYL CITRATE 50 UG/ML
50 INJECTION, SOLUTION INTRAMUSCULAR; INTRAVENOUS
Status: DISCONTINUED | OUTPATIENT
Start: 2022-10-24 | End: 2022-10-24 | Stop reason: HOSPADM

## 2022-10-24 RX ORDER — SODIUM CHLORIDE 0.9 % (FLUSH) 0.9 %
10 SYRINGE (ML) INJECTION EVERY 12 HOURS SCHEDULED
Status: CANCELLED | OUTPATIENT
Start: 2022-10-24

## 2022-10-24 RX ORDER — FUROSEMIDE 40 MG/1
1 TABLET ORAL DAILY
Status: CANCELLED | OUTPATIENT
Start: 2022-10-24

## 2022-10-24 RX ORDER — INSULIN LISPRO 100 [IU]/ML
0-4 INJECTION, SOLUTION INTRAVENOUS; SUBCUTANEOUS NIGHTLY
Status: CANCELLED | OUTPATIENT
Start: 2022-10-24

## 2022-10-24 RX ORDER — POTASSIUM CHLORIDE 7.45 MG/ML
10 INJECTION INTRAVENOUS PRN
Status: CANCELLED | OUTPATIENT
Start: 2022-10-24

## 2022-10-24 RX ORDER — SODIUM CHLORIDE 9 MG/ML
INJECTION, SOLUTION INTRAVENOUS PRN
Status: CANCELLED | OUTPATIENT
Start: 2022-10-24

## 2022-10-24 RX ORDER — INSULIN LISPRO 100 [IU]/ML
0-8 INJECTION, SOLUTION INTRAVENOUS; SUBCUTANEOUS
Status: CANCELLED | OUTPATIENT
Start: 2022-10-24

## 2022-10-24 RX ORDER — ACETAMINOPHEN 325 MG/1
650 TABLET ORAL EVERY 6 HOURS PRN
Status: CANCELLED | OUTPATIENT
Start: 2022-10-24

## 2022-10-24 RX ORDER — SODIUM CHLORIDE 9 MG/ML
INJECTION, SOLUTION INTRAVENOUS CONTINUOUS
Status: DISCONTINUED | OUTPATIENT
Start: 2022-10-24 | End: 2022-10-24 | Stop reason: HOSPADM

## 2022-10-24 RX ORDER — ATORVASTATIN CALCIUM 20 MG/1
20 TABLET, FILM COATED ORAL DAILY
Status: CANCELLED | OUTPATIENT
Start: 2022-10-24

## 2022-10-24 RX ORDER — ONDANSETRON 4 MG/1
4 TABLET, ORALLY DISINTEGRATING ORAL EVERY 8 HOURS PRN
Status: CANCELLED | OUTPATIENT
Start: 2022-10-24

## 2022-10-24 RX ORDER — ONDANSETRON 2 MG/ML
4 INJECTION INTRAMUSCULAR; INTRAVENOUS EVERY 6 HOURS PRN
Status: DISCONTINUED | OUTPATIENT
Start: 2022-10-24 | End: 2022-10-24 | Stop reason: HOSPADM

## 2022-10-24 RX ORDER — POTASSIUM CHLORIDE 20 MEQ/1
40 TABLET, EXTENDED RELEASE ORAL PRN
Status: CANCELLED | OUTPATIENT
Start: 2022-10-24

## 2022-10-24 RX ORDER — FAMOTIDINE 20 MG/1
1 TABLET, FILM COATED ORAL 2 TIMES DAILY
Status: CANCELLED | OUTPATIENT
Start: 2022-10-24

## 2022-10-24 RX ORDER — SODIUM CHLORIDE 0.9 % (FLUSH) 0.9 %
10 SYRINGE (ML) INJECTION PRN
Status: CANCELLED | OUTPATIENT
Start: 2022-10-24

## 2022-10-24 RX ORDER — DEXTROSE MONOHYDRATE 100 MG/ML
INJECTION, SOLUTION INTRAVENOUS CONTINUOUS PRN
Status: CANCELLED | OUTPATIENT
Start: 2022-10-24

## 2022-10-24 RX ORDER — FENTANYL CITRATE 50 UG/ML
100 INJECTION, SOLUTION INTRAMUSCULAR; INTRAVENOUS
Status: DISCONTINUED | OUTPATIENT
Start: 2022-10-24 | End: 2022-10-24 | Stop reason: HOSPADM

## 2022-10-24 RX ORDER — ONDANSETRON 2 MG/ML
4 INJECTION INTRAMUSCULAR; INTRAVENOUS EVERY 6 HOURS PRN
Status: CANCELLED | OUTPATIENT
Start: 2022-10-24

## 2022-10-24 RX ORDER — ACETAMINOPHEN 650 MG/1
650 SUPPOSITORY RECTAL EVERY 6 HOURS PRN
Status: CANCELLED | OUTPATIENT
Start: 2022-10-24

## 2022-10-24 RX ORDER — MAGNESIUM SULFATE 1 G/100ML
1000 INJECTION INTRAVENOUS PRN
Status: CANCELLED | OUTPATIENT
Start: 2022-10-24

## 2022-10-24 ASSESSMENT — PAIN - FUNCTIONAL ASSESSMENT: PAIN_FUNCTIONAL_ASSESSMENT: 0-10

## 2022-10-24 ASSESSMENT — ENCOUNTER SYMPTOMS
SHORTNESS OF BREATH: 0
COLOR CHANGE: 0
BACK PAIN: 0
EYE PAIN: 0
NAUSEA: 1
ABDOMINAL PAIN: 1

## 2022-10-24 ASSESSMENT — PAIN SCALES - GENERAL: PAINLEVEL_OUTOF10: 0

## 2022-10-24 NOTE — ED PROVIDER NOTES
Type 2 diabetes mellitus (HCC)     Ventral hernia, unspecified, without mention of obstruction or gangrene      Past Problem List  Patient Active Problem List   Diagnosis Code    Sprain of ribs S23.41XA    Neoplasm of uncertain behavior of prostate D40.0    Essential hypertension, benign I10    Herpes zoster B02.9    Ventral hernia K43.9    OA (osteoarthritis) of finger M19.049    Muscular cramp R25.2    Type 2 diabetes mellitus without complication ([facility] [address] R29.5    Depressed affect R45.89    Aortic stenosis, mild I35.0    Heel spur M77.30    Osteoarthritis of knee, unilateral M17.10    Bilateral leg edema R60.0    Right lower quadrant abdominal pain R10.31    Allergy to ACE inhibitors Z88.8    Mixed hyperlipidemia E78.2    Type 2 diabetes mellitus without complication, without long-term current use of insulin ([facility]) E11.9    S/P CABG x 3 Z95.1    H/O aortic valve replacement Z95.2    Hyperbilirubinemia E80.6    Chronic diastolic congestive heart failure ([facility]) I50.32    Elevated LFTs R79.89    PAF (paroxysmal atrial fibrillation) ([facility]) I48.0    Long term (current) use of anticoagulants Z79.01    ASCVD (arteriosclerotic cardiovascular disease) I25.10    Calculus of gallbladder and bile duct K80.70     SURGICAL HISTORY       Past Surgical History:   Procedure Laterality Date    CARDIAC SURGERY  01/05/2017    Triple bypass    CARDIAC VALVE REPLACEMENT  01/05/2017    HERNIA REPAIR      HERNIA REPAIR Bilateral     KNEE ARTHROSCOPY      KNEE ARTHROSCOPY Bilateral     PROSTATE SURGERY      VEIN SURGERY Left      CURRENT MEDICATIONS       Discharge Medication List as of 10/24/2022  7:12 PM        CONTINUE these medications which have NOT CHANGED    Details   fluticasone (FLONASE) 50 MCG/ACT nasal spray Historical Med      polyethylene glycol (GLYCOLAX) powder Historical Med      baclofen (LIORESAL) 10 MG tablet Take 10 mg by mouth 3 times dailyHistorical Med      famotidine (PEPCID) 20 MG tablet TAKE ONE TABLET BY MOUTH TWICE DAILY, Disp-14 tablet, R-0Normal      furosemide (LASIX) 40 MG tablet TAKE ONE TABLET BY MOUTH ONCE DAILY, Disp-30 tablet, R-3Normal      atorvastatin (LIPITOR) 20 MG tablet Take 1 tablet by mouth daily, Disp-30 tablet, R-6Normal      amiodarone (CORDARONE) 200 MG tablet Take 1 tablet by mouth daily, Disp-30 tablet, R-6Normal      metoprolol tartrate (LOPRESSOR) 25 MG tablet Historical Med      warfarin (COUMADIN) 5 MG tablet Take 5 mg by mouth daily      metFORMIN (GLUCOPHAGE) 500 MG tablet Take 1 tablet by mouth 2 times daily (with meals), Disp-180 tablet, R-3      aspirin 81 MG tablet Take 1 tablet by mouth daily (with breakfast). , Disp-90 tablet, R-3           ALLERGIES     is allergic to lisinopril, lisinopril, and amlodipine. FAMILY HISTORY     He indicated that his mother is . He indicated that his father is . He indicated that the status of his brother is unknown. He indicated that the status of his other is unknown. SOCIAL HISTORY       Social History     Tobacco Use    Smoking status: Former    Smokeless tobacco: Never   Substance Use Topics    Alcohol use: No    Drug use: No     PHYSICAL EXAM     INITIAL VITALS: BP (!) 154/80   Pulse 72   Temp 98.6 °F (37 °C)   Resp 16   Ht 5' 6\" (1.676 m)   Wt 190 lb (86.2 kg)   SpO2 99%   BMI 30.67 kg/m²    Physical Exam  Vitals and nursing note reviewed. Constitutional:       General: He is not in acute distress. Appearance: Normal appearance. He is not ill-appearing. HENT:      Head: Normocephalic and atraumatic. Right Ear: External ear normal.      Left Ear: External ear normal.      Nose: Nose normal.      Mouth/Throat:      Mouth: Mucous membranes are moist.   Eyes:      Extraocular Movements: Extraocular movements intact. Pupils: Pupils are equal, round, and reactive to light. Cardiovascular:      Rate and Rhythm: Normal rate and regular rhythm. Pulses: Normal pulses. Heart sounds: Normal heart sounds. Pulmonary:      Effort: Pulmonary effort is normal.      Breath sounds: Normal breath sounds. Abdominal:      General: Abdomen is flat. Palpations: Abdomen is soft. Tenderness: There is generalized abdominal tenderness. Musculoskeletal:         General: No tenderness. Normal range of motion. Cervical back: Neck supple. No spinous process tenderness or muscular tenderness. Skin:     General: Skin is warm and dry. Capillary Refill: Capillary refill takes less than 2 seconds. Neurological:      General: No focal deficit present. Mental Status: He is alert and oriented to person, place, and time. Cranial Nerves: Cranial nerves 2-12 are intact. Sensory: Sensation is intact. Motor: Motor function is intact. Psychiatric:         Behavior: Behavior normal.         Thought Content: Thought content does not include homicidal or suicidal ideation. MEDICAL DECISION MAKIN-year-old male presents for complaints of abdominal pain and dark urine. On initial exam patient in no acute distress vitals stable, currently denying pain, abdomen is soft tenderness was in the lower abdomen, will check labs and CT    Labs reviewed, creatinine at 1.2, alk phos at 361, ALT at 350 AST at 75, T bili at 2.3 lipase at 167    CT was showing cholelithiasis and mild gallbladder wall thickening    Patient was reevaluated reporting no pain at this time however given the findings of the transaminitis as well as cholelithiasis and gallbladder wall thickening concerning for possible bile duct stone    Discussed with general surgery Dr. Swati Bender, who is recommending admission MRCP and he will evaluate patient    Results were discussed with patient and daughter, discussed need for admission    Patient does not want to be admitted at this time and will be signing out AMA    The patient has decided to leave against medical advice and is refusing all further workup and testing.   The patient has normal mental status and adequate capacity to make medical decisions and has the capacity to make decisions. The patient refuses hospital admission and wants to be discharged. The risks have been explained to the patient, including worsening illness, chronic pain, permanent disability, loss of organs, and death. The benefits of further testing and admission have also been explained, including the availability and proximity of nurses, physicians, monitoring, diagnostic testing, and treatment. The patient was able to understand and state the risks and benefits of hospital admission. This was witnessed by the nurse and me. The patient had the opportunity to ask questions about medical conditions. The patient was treated to the extent that the patient allowed, and knows that may return for care at any time. Follow up has been discussed patient will see pcp tomorrow. CRITICAL CARE:       PROCEDURES:    Procedures    DIAGNOSTIC RESULTS   EKG:All EKG's are interpreted by the Emergency Department Physician who either signs or Co-signs this chart in the absence of a cardiologist.    Sinus rhythm rate of 69, left axis, no ST segment elevation or depression nonspecific T wave change    RADIOLOGY:All plain film, CT, MRI, and formal ultrasound images (except ED bedside ultrasound) are read by the radiologist, see reports below, unless otherwisenoted in MDM or here. CT ABDOMEN PELVIS WO CONTRAST Additional Contrast? None   Final Result   No urolithiasis or acute obstructive uropathy. No definite etiology   identified for hematuria. Follow-up cystoscopy and contrast CT study should   be considered in further evaluation of hematuria. Pancolonic diverticulosis, moderate in the sigmoid region. No evidence of   diverticulitis. Cholelithiasis. Mild gallbladder wall thickening with no pericholecystic   fluid or fat stranding.          XR CHEST PORTABLE   Final Result   No definite acute finding, consolidation or sizable pleural effusion. Apparent mildly increased basilar interstitial markings, better seen right   base laterally. If there is clinical concern for ILD (e.g.  UIP), consider   correlation with PFTs. CT AP scheduled and should allow further assessment   lung bases. LABS: All lab results were reviewed by myself, and all abnormals are listed below.   Labs Reviewed   CBC WITH AUTO DIFFERENTIAL - Abnormal; Notable for the following components:       Result Value    RBC 4.40 (*)     Hemoglobin 12.7 (*)     Hematocrit 38.9 (*)     Monocytes 9 (*)     All other components within normal limits   COMPREHENSIVE METABOLIC PANEL - Abnormal; Notable for the following components:    Glucose 172 (*)     BUN 34 (*)     Creatinine 1.21 (*)     Chloride 95 (*)     Alkaline Phosphatase 361 (*)      (*)     AST 75 (*)     Total Bilirubin 2.3 (*)     All other components within normal limits   LIPASE - Abnormal; Notable for the following components:    Lipase 167 (*)     All other components within normal limits   TROPONIN - Abnormal; Notable for the following components:    Troponin, High Sensitivity 23 (*)     All other components within normal limits   MAGNESIUM   URINALYSIS WITH REFLEX TO CULTURE       EMERGENCY DEPARTMENTCOURSE:         Vitals:    Vitals:    10/24/22 1246   BP: (!) 154/80   Pulse: 72   Resp: 16   Temp: 98.6 °F (37 °C)   SpO2: 99%   Weight: 190 lb (86.2 kg)   Height: 5' 6\" (1.676 m)       The patient was given the following medications while in the emergency department:  Orders Placed This Encounter   Medications    DISCONTD: 0.9 % sodium chloride infusion    DISCONTD: famotidine (PEPCID) 20 mg in sodium chloride (PF) 10 mL injection    DISCONTD: ondansetron (ZOFRAN) injection 4 mg    DISCONTD: fentaNYL (SUBLIMAZE) injection 50 mcg    DISCONTD: fentaNYL (SUBLIMAZE) injection 100 mcg    DISCONTD: pantoprazole (PROTONIX) 40 mg in sodium chloride 0.9 % 50 mL bolus    DISCONTD: piperacillin-tazobactam (ZOSYN) 3,375 mg in dextrose 5 % 50 mL IVPB extended infusion (mini-bag)     Order Specific Question:   Antimicrobial Indications     Answer:   Intra-Abdominal Infection    DISCONTD: pantoprazole (PROTONIX) 40 mg in sodium chloride (PF) 10 mL injection    DISCONTD: piperacillin-tazobactam (ZOSYN) 3,375 mg in dextrose 5 % 50 mL IVPB extended infusion (mini-bag)     Order Specific Question:   Antimicrobial Indications     Answer:   Intra-Abdominal Infection    DISCONTD: piperacillin-tazobactam (ZOSYN) 4,500 mg in dextrose 5 % 100 mL IVPB (mini-bag)     Order Specific Question:   Antimicrobial Indications     Answer:   Intra-Abdominal Infection    DISCONTD: piperacillin-tazobactam (ZOSYN) 3,375 mg in sodium chloride 0.9 % 50 mL IVPB (mini-bag)     Order Specific Question:   Antimicrobial Indications     Answer:   Intra-Abdominal Infection       CONSULTS:  IP CONSULT TO GENERAL SURGERY  IP CONSULT TO FAMILY MEDICINE    FINAL IMPRESSION      1. Transaminitis          DISPOSITION/PLAN   DISPOSITION [place] 10/24/2022 05:53:13 PM      PATIENT REFERRED TO:  No follow-up provider specified. DISCHARGE MEDICATIONS:  Discharge Medication List as of 10/24/2022  7:12 PM        The care is provided during an unprecedented national emergency due to the novel coronavirus, COVID 19.   DO Dorian Sands DO  10/24/22 5275

## 2022-10-24 NOTE — CONSULTS
General Surgery Consult      Pt Name: Cici Ayers  MRN: [id number]  YOB: 1940  Date of evaluation: 10/24/2022  Primary Care Physician: Ezio Christian MD   Patient evaluated at the request of  Dr. Koko Santiago  Reason for evaluation: Abdominal pain    SUBJECTIVE:   History of Chief Complaint:    Cici Ayers is a 81 y.o. male who presents with abdominal pain and dark-colored urine. Started last week. Patient was having upper abdominal pain nausea vomiting. No fever chills. No distention. History of kidney stone. History of prostate cancer. No other acute symptoms. Emergency room work-up reviewed. Symptom onset has been acute for a time period of few day(s). Severity is described as moderate. Course of his symptoms over time is acute. Past Medical History   has a past medical history of Cancer ([facility] [address] Diabetes ([address] Edema, Essential hypertension, benign, Gout, Heart burn, Herpes zoster without mention of complication, Hypertension, Neoplasm of uncertain behavior of prostate, Osteoarthritis, Pain in joint, lower leg, Psychogenic pain, site unspecified, Sprain of ribs, Type 2 diabetes mellitus ([address] and Ventral hernia, unspecified, without mention of obstruction or gangrene. Past Surgical History   has a past surgical history that includes hernia repair; Knee arthroscopy; Cardiac valve replacement (01/05/2017); Cardiac surgery (01/05/2017); Vein Surgery (Left); Knee arthroscopy (Bilateral); Prostate surgery; and hernia repair (Bilateral). Medications  Prior to Admission medications    Medication Sig Start Date End Date Taking?  Authorizing Provider   fluticasone [facility]) 50 MCG/ACT nasal spray  1/2/18   Historical Provider, MD   polyethylene glycol (GLYCOLAX) powder  1/24/18   Historical Provider, MD   baclofen (LIORESAL) 10 MG tablet Take 10 mg by mouth 3 times daily  Patient not taking: Reported on 8/16/2021    Historical Provider, MD   famotidine (PEPCID) 20 MG tablet TAKE ONE TABLET BY MOUTH TWICE DAILY 7/10/17   Erin Guzman MD   furosemide (LASIX) 40 MG tablet TAKE ONE TABLET BY MOUTH ONCE DAILY 4/24/17   Erin Guzman MD   atorvastatin (LIPITOR) 20 MG tablet Take 1 tablet by mouth daily 2/13/17   Erin Guzman MD   amiodarone (CORDARONE) 200 MG tablet Take 1 tablet by mouth daily 2/13/17   Erin Guzman MD   metoprolol tartrate (LOPRESSOR) 25 MG tablet  12/21/16   Historical Provider, MD   warfarin (COUMADIN) 5 MG tablet Take 5 mg by mouth daily    Historical Provider, MD   metFORMIN (GLUCOPHAGE) 500 MG tablet Take 1 tablet by mouth 2 times daily (with meals) 6/7/16   Erin Guzman MD   aspirin 81 MG tablet Take 1 tablet by mouth daily (with breakfast). 12/16/14   Erin Guzman MD     Allergies  is allergic to lisinopril, lisinopril, and amlodipine. Family History  family history includes Arthritis in his mother and another family member; Cancer in his father and mother; Diabetes in his brother and another family member. Social History   reports that he has quit smoking. He has never used smokeless tobacco. He reports that he does not drink alcohol and does not use drugs. Review of Systems:  General Denies any fever or chills  HEENT Denies any diplopia, tinnitus or vertigo  Resp Denies any shortness of breath, cough or wheezing  Cardiac Denies any chest pain, palpitations, claudication or edema  GI Denies any melena, hematochezia, hematemesis or pyrosis   Denies any frequency, urgency, hesitancy or incontinence  Heme Denies bruising or bleeding easily  Endocrine Denies any history of diabetes or thyroid disease  Neuro Denies any focal motor or sensory deficits    OBJECTIVE:   VITALS:  height is 5' 6\" (1.676 m) and weight is 190 lb (86.2 kg). His temperature is 98.6 °F (37 °C). His blood pressure is 154/80 (abnormal) and his pulse is 72. His respiration is 16 and oxygen saturation is 99%.    CONSTITUTIONAL: Alert and oriented times 3, no acute distress and cooperative to examination with proper mood and affect. SKIN: Skin color, texture, turgor normal. No rashes or lesions. LYMPH: no cervical nodes, no inguinal nodes  HEENT: Head is normocephalic, atraumatic. EOMI, PERRLA  NECK: Supple, symmetrical, trachea midline, no adenopathy, thyroid symmetric, not enlarged and no tenderness, skin normal  CHEST/LUNGS: chest symmetric with normal A/P diameter, normal respiratory rate and rhythm, lungs clear to auscultation without wheezes, rales or rhonchi. No accessory muscle use. Scars None   CARDIOVASCULAR: Heart regular rate and rhythm Normal S1 and S2. . Carotid and femoral pulses 2+/4 and equal bilaterally  ABDOMEN: Soft abdomen nondistended tender epigastric right upper quadrant. Surgical incision well-healed. RECTAL: deferred, not clinically indicated  NEUROLOGIC: There are no focalizing motor or sensory deficits. CN II-XII are grossly intact.   EXTREMITIES: no cyanosis, no clubbing, and no edema    LABS:   CBC with Differential:    Lab Results   Component Value Date/Time    WBC 6.4 10/24/2022 03:20 PM    RBC 4.40 10/24/2022 03:20 PM    HGB 12.7 10/24/2022 03:20 PM    HCT 38.9 10/24/2022 03:20 PM     10/24/2022 03:20 PM    MCV 88.5 10/24/2022 03:20 PM    MCH 29.0 10/24/2022 03:20 PM    MCHC 32.8 10/24/2022 03:20 PM    RDW 14.9 10/24/2022 03:20 PM    LYMPHOPCT 29 10/24/2022 03:20 PM    MONOPCT 9 10/24/2022 03:20 PM    BASOPCT 1 10/24/2022 03:20 PM    MONOSABS 0.60 10/24/2022 03:20 PM    LYMPHSABS 1.90 10/24/2022 03:20 PM    EOSABS 0.10 10/24/2022 03:20 PM    BASOSABS 0.00 10/24/2022 03:20 PM     BMP:    Lab Results   Component Value Date/Time     10/24/2022 03:20 PM    K 4.1 10/24/2022 03:20 PM    CL 95 10/24/2022 03:20 PM    CO2 27 10/24/2022 03:20 PM    BUN 34 10/24/2022 03:20 PM    LABALBU 4.3 10/24/2022 03:20 PM    CREATININE 1.21 10/24/2022 03:20 PM    CALCIUM 10.1 10/24/2022 03:20 PM    LABGLOM >60 10/24/2022 03:20 PM    GLUCOSE 172 10/24/2022 03:20 PM     Hepatic Function Panel:    Lab Results   Component Value Date/Time    ALKPHOS 361 10/24/2022 03:20 PM     10/24/2022 03:20 PM    AST 75 10/24/2022 03:20 PM    PROT 7.5 10/24/2022 03:20 PM    BILITOT 2.3 10/24/2022 03:20 PM    LABALBU 4.3 10/24/2022 03:20 PM     Calcium:    Lab Results   Component Value Date/Time    CALCIUM 10.1 10/24/2022 03:20 PM     Magnesium:    Lab Results   Component Value Date/Time    MG 1.8 10/24/2022 03:20 PM     Phosphorus:  No results found for: PHOS  PT/INR:  No results found for: PROTIME, INR  ABG:  No results found for: PHART, PH, PCP1AEQ, PCO2, PO2ART, PO2, EFK7OPF, HCO3, BEART, BE, THGBART, THB, QSX0KFZ, X5TYHSJL, O2SAT  Urine Culture:  No components found for: CURINE  Blood Culture:  No components found for: CBLOOD, CFUNGUSBL  Stool Culture:  No components found for: CSTOOL    RADIOLOGY:   I have personally reviewed the following films:  CT ABDOMEN PELVIS WO CONTRAST Additional Contrast? None    Result Date: 10/24/2022  EXAMINATION: CT OF THE ABDOMEN AND PELVIS WITHOUT CONTRAST 10/24/2022 3:35 pm TECHNIQUE: CT of the abdomen and pelvis was performed without the administration of intravenous contrast. Multiplanar reformatted images are provided for review. Automated exposure control, iterative reconstruction, and/or weight based adjustment of the mA/kV was utilized to reduce the radiation dose to as low as reasonably achievable. COMPARISON: None. HISTORY: ORDERING SYSTEM PROVIDED HISTORY: pain, hematuria TECHNOLOGIST PROVIDED HISTORY: pain, hematuria Decision Support Exception - unselect if not a suspected or confirmed emergency medical condition->Emergency Medical Condition (MA) Reason for Exam: pain, hematuria Additional signs and symptoms: abd pain since wednesday, blood in urine Relevant Medical/Surgical History: hx of kidney stones, diabetes, htn, ventral hernia FINDINGS: Lower Chest: Lung bases are emphysematous and clear.   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 Organs: There is an oval-shaped calcified gallstone. No pericholecystic fluid or fat stranding. Mild gallbladder wall thickening. The liver, spleen, pancreas and adrenal glands are grossly with the limitations of a noncontrast study. There are 2 left renal cysts measuring up to 3.4 cm in the upper pole of the left kidney. There is no urolithiasis or acute obstructive uropathy. There is a right renal artery calcification. GI/Bowel: Mild stool load in the right and transverse colon. The appendix is normal.  Moderate sigmoid colon diverticulosis. Scattered diverticula throughout the rest of the colon. No evidence of diverticulitis. No bowel obstruction. Pelvis: Status post prostatectomy. Urinary bladder was not well distended but appeared grossly normal. Peritoneum/Retroperitoneum: There is no adenopathy, free air or free fluid. No abdominal aortic aneurysm. There are calcified lymph nodes in the mesentery (axial image 102-109). Bones/Soft Tissues: There are multilevel degenerative changes in lower thoracic and lumbar spine. No acute finding. Tiny fat containing umbilical hernia. No urolithiasis or acute obstructive uropathy. No definite etiology identified for hematuria. Follow-up cystoscopy and contrast CT study should be considered in further evaluation of hematuria. Pancolonic diverticulosis, moderate in the sigmoid region. No evidence of diverticulitis. Cholelithiasis. Mild gallbladder wall thickening with no pericholecystic fluid or fat stranding. XR CHEST PORTABLE    Result Date: 10/24/2022  EXAMINATION: ONE XRAY VIEW OF THE CHEST 10/24/2022 3:16 pm COMPARISON: Two-view chest from 10/09/2006 HISTORY: ORDERING SYSTEM PROVIDED HISTORY: cough TECHNOLOGIST PROVIDED HISTORY: cough Reason for Exam: Pt. states chest pain, blood in urine, dizziness x 6-8 days History of diabetes, gout, hypertension, aortic valve replacement, and CABG.  FINDINGS: Midline sternotomy wires and clips status post CABG; rotated to the left. Cardiomediastinal shadow stable. Elevated right hemidiaphragm and apparent bibasilar interstitial changes, slightly increased as compared to the previous study best seen right lateral base. No localized or confluent pulmonary opacity, or significant blunting of the costophrenic angles. Bones appear unchanged. No definite acute finding, consolidation or sizable pleural effusion. Apparent mildly increased basilar interstitial markings, better seen right base laterally. If there is clinical concern for ILD (e.g.  UIP), consider correlation with PFTs. CT AP scheduled and should allow further assessment lung bases. IMPRESSION:   Abdominal pain  Cholelithiasis mild gallbladder wall thickening no pericholecystic fluid or fat stranding. Pandiverticulosis. No evidence of diverticulitis. No urolithiasis. No obstructive uropathy. BMP noted. Liver function test reveals elevated alkaline phosphatase AST ALT and bilirubin. WBC count normal.  Hemoglobin adequate. does not have any pertinent problems on file. PLAN:   Admission to the hospital under medical service. GI consultation. MRI MRCP. Once GI work-up is complete patient will need cholecystectomy. I will follow with you. Thank you for this interesting consult and for allowing us to participate in the care of this patient. If you have any questions please don't hesitate to call.           Electronically signed by Mounika Booth MD  on 10/24/2022 at 6:39 PM

## 2022-10-24 NOTE — ED NOTES
Pt refused  For further  Investigation and treatment in ED.  Signed LAMA   risks explained , pt   Understands      Justin Wei RN  10/24/22 [id number]

## 2022-10-24 NOTE — ED TRIAGE NOTES
Mode of arrival (squad #, walk in, police, etc) : walk in        Chief complaint(s): blood in urine/ abdominal pain        Arrival Note (brief scenario, treatment PTA, etc). : states he started shaking for 5 minutes then had dry heaves. Pain in abdomen. Blood in urine x 1 week. C= \"Have you ever felt that you should Cut down on your drinking? \"  No  A= \"Have people Annoyed you by criticizing your drinking? \"  No  G= \"Have you ever felt bad or Guilty about your drinking? \"  No  E= \"Have you ever had a drink as an Eye-opener first thing in the morning to steady your nerves or to help a hangover? \"  No      Deferred []      Reason for deferring: N/A    *If yes to two or more: probable alcohol abuse. *

## 2022-10-25 LAB
EKG ATRIAL RATE: 69 BPM
EKG P AXIS: 32 DEGREES
EKG P-R INTERVAL: 166 MS
EKG Q-T INTERVAL: 392 MS
EKG QRS DURATION: 98 MS
EKG QTC CALCULATION (BAZETT): 420 MS
EKG R AXIS: -35 DEGREES
EKG T AXIS: 46 DEGREES
EKG VENTRICULAR RATE: 69 BPM

## 2022-10-25 PROCEDURE — 93010 ELECTROCARDIOGRAM REPORT: CPT | Performed by: INTERNAL MEDICINE

## 2022-10-25 NOTE — ADT AUTH CERT
[facility] ED  [address]  [address]  [phone]   [id number]    Auth number: N/A    [id number] - [facility] Managed)    Return Contact Information:  [place] MAYER:[phone]  GENIE:[phone]

## 2023-02-15 ENCOUNTER — TELEPHONE (OUTPATIENT)
Dept: GASTROENTEROLOGY | Age: 83
End: 2023-02-15

## 2023-02-15 NOTE — TELEPHONE ENCOUNTER
Patient's daughter (HIPAA) called office asking for general information on GI part in gallbladder issue. It is explained to her.   She is going to have PCP send referral.

## 2023-06-13 ENCOUNTER — HOSPITAL ENCOUNTER (INPATIENT)
Age: 83
LOS: 4 days | Discharge: HOME | DRG: 418 | End: 2023-06-17
Attending: EMERGENCY MEDICINE | Admitting: INTERNAL MEDICINE
Payer: COMMERCIAL

## 2023-06-13 ENCOUNTER — APPOINTMENT (OUTPATIENT)
Dept: CT IMAGING | Age: 83
DRG: 418 | End: 2023-06-13
Payer: COMMERCIAL

## 2023-06-13 ENCOUNTER — APPOINTMENT (OUTPATIENT)
Dept: MRI IMAGING | Age: 83
DRG: 418 | End: 2023-06-13
Payer: COMMERCIAL

## 2023-06-13 DIAGNOSIS — K80.50 CHOLEDOCHOLITHIASIS: ICD-10-CM

## 2023-06-13 DIAGNOSIS — K81.0 ACUTE CHOLECYSTITIS: ICD-10-CM

## 2023-06-13 DIAGNOSIS — K81.1 CHRONIC CHOLECYSTITIS: ICD-10-CM

## 2023-06-13 DIAGNOSIS — K81.9 CHOLECYSTITIS: Primary | ICD-10-CM

## 2023-06-13 PROBLEM — C80.1 CANCER (HCC): Status: ACTIVE | Noted: 2023-06-13

## 2023-06-13 LAB
ALBUMIN SERPL-MCNC: 4 G/DL (ref 3.5–5.2)
ALP SERPL-CCNC: 65 U/L (ref 40–129)
ALT SERPL-CCNC: 13 U/L (ref 5–41)
ANION GAP SERPL CALCULATED.3IONS-SCNC: 12 MMOL/L (ref 9–17)
AST SERPL-CCNC: 17 U/L
BASOPHILS # BLD: 0 K/UL (ref 0–0.2)
BASOPHILS NFR BLD: 1 % (ref 0–2)
BILIRUB SERPL-MCNC: 0.4 MG/DL (ref 0.3–1.2)
BILIRUB UR QL STRIP: NEGATIVE
BUN SERPL-MCNC: 18 MG/DL (ref 8–23)
CALCIUM SERPL-MCNC: 9.6 MG/DL (ref 8.6–10.4)
CHLORIDE SERPL-SCNC: 102 MMOL/L (ref 98–107)
CLARITY UR: CLEAR
CO2 SERPL-SCNC: 23 MMOL/L (ref 20–31)
COLOR UR: YELLOW
COMMENT UA: NORMAL
CREAT SERPL-MCNC: 0.76 MG/DL (ref 0.7–1.2)
EOSINOPHIL # BLD: 0.1 K/UL (ref 0–0.4)
EOSINOPHILS RELATIVE PERCENT: 1 % (ref 0–4)
ERYTHROCYTE [DISTWIDTH] IN BLOOD BY AUTOMATED COUNT: 15 % (ref 11.5–14.9)
GFR SERPL CREATININE-BSD FRML MDRD: >60 ML/MIN/1.73M2
GLUCOSE BLD-MCNC: 122 MG/DL (ref 75–110)
GLUCOSE BLD-MCNC: 99 MG/DL (ref 75–110)
GLUCOSE SERPL-MCNC: 121 MG/DL (ref 70–99)
GLUCOSE UR STRIP-MCNC: NEGATIVE MG/DL
HCT VFR BLD AUTO: 38.8 % (ref 41–53)
HGB BLD-MCNC: 12.4 G/DL (ref 13.5–17.5)
HGB UR QL STRIP.AUTO: NEGATIVE
INR PPP: 1
KETONES UR STRIP-MCNC: NEGATIVE MG/DL
LACTATE BLDV-SCNC: 2.1 MMOL/L (ref 0.5–2.2)
LEUKOCYTE ESTERASE UR QL STRIP: NEGATIVE
LIPASE SERPL-CCNC: 26 U/L (ref 13–60)
LYMPHOCYTES # BLD: 27 % (ref 24–44)
LYMPHOCYTES NFR BLD: 1.8 K/UL (ref 1–4.8)
MCH RBC QN AUTO: 28.9 PG (ref 26–34)
MCHC RBC AUTO-ENTMCNC: 31.9 G/DL (ref 31–37)
MCV RBC AUTO: 90.6 FL (ref 80–100)
MONOCYTES NFR BLD: 0.5 K/UL (ref 0.1–1.3)
MONOCYTES NFR BLD: 7 % (ref 1–7)
NEUTROPHILS NFR BLD: 64 % (ref 36–66)
NEUTS SEG NFR BLD: 4.5 K/UL (ref 1.3–9.1)
NITRITE UR QL STRIP: NEGATIVE
PH UR STRIP: 6.5 [PH] (ref 5–8)
PLATELET # BLD AUTO: 219 K/UL (ref 150–450)
PMV BLD AUTO: 8.9 FL (ref 6–12)
POTASSIUM SERPL-SCNC: 4.5 MMOL/L (ref 3.7–5.3)
PROT SERPL-MCNC: 6.8 G/DL (ref 6.4–8.3)
PROT UR STRIP-MCNC: NEGATIVE MG/DL
PROTHROMBIN TIME: 13.1 SEC (ref 11.8–14.6)
RBC # BLD AUTO: 4.29 M/UL (ref 4.5–5.9)
SODIUM SERPL-SCNC: 137 MMOL/L (ref 135–144)
SP GR UR STRIP: 1.02 (ref 1–1.03)
UROBILINOGEN UR STRIP-ACNC: NORMAL
WBC OTHER # BLD: 6.9 K/UL (ref 3.5–11)

## 2023-06-13 PROCEDURE — 99223 1ST HOSP IP/OBS HIGH 75: CPT | Performed by: INTERNAL MEDICINE

## 2023-06-13 PROCEDURE — 6370000000 HC RX 637 (ALT 250 FOR IP)

## 2023-06-13 PROCEDURE — 74183 MRI ABD W/O CNTR FLWD CNTR: CPT

## 2023-06-13 PROCEDURE — 2500000003 HC RX 250 WO HCPCS: Performed by: EMERGENCY MEDICINE

## 2023-06-13 PROCEDURE — 83605 ASSAY OF LACTIC ACID: CPT

## 2023-06-13 PROCEDURE — 80053 COMPREHEN METABOLIC PANEL: CPT

## 2023-06-13 PROCEDURE — 6360000002 HC RX W HCPCS

## 2023-06-13 PROCEDURE — 83690 ASSAY OF LIPASE: CPT

## 2023-06-13 PROCEDURE — 99285 EMERGENCY DEPT VISIT HI MDM: CPT

## 2023-06-13 PROCEDURE — 36415 COLL VENOUS BLD VENIPUNCTURE: CPT

## 2023-06-13 PROCEDURE — 6360000002 HC RX W HCPCS: Performed by: EMERGENCY MEDICINE

## 2023-06-13 PROCEDURE — 1200000000 HC SEMI PRIVATE

## 2023-06-13 PROCEDURE — 6360000004 HC RX CONTRAST MEDICATION: Performed by: EMERGENCY MEDICINE

## 2023-06-13 PROCEDURE — 6360000004 HC RX CONTRAST MEDICATION: Performed by: NURSE PRACTITIONER

## 2023-06-13 PROCEDURE — 81003 URINALYSIS AUTO W/O SCOPE: CPT

## 2023-06-13 PROCEDURE — 85610 PROTHROMBIN TIME: CPT

## 2023-06-13 PROCEDURE — 74177 CT ABD & PELVIS W/CONTRAST: CPT

## 2023-06-13 PROCEDURE — 2580000003 HC RX 258

## 2023-06-13 PROCEDURE — 96374 THER/PROPH/DIAG INJ IV PUSH: CPT

## 2023-06-13 PROCEDURE — 85027 COMPLETE CBC AUTOMATED: CPT

## 2023-06-13 PROCEDURE — 82947 ASSAY GLUCOSE BLOOD QUANT: CPT

## 2023-06-13 PROCEDURE — 2580000003 HC RX 258: Performed by: EMERGENCY MEDICINE

## 2023-06-13 PROCEDURE — A9579 GAD-BASE MR CONTRAST NOS,1ML: HCPCS | Performed by: NURSE PRACTITIONER

## 2023-06-13 RX ORDER — SODIUM CHLORIDE 0.9 % (FLUSH) 0.9 %
10 SYRINGE (ML) INJECTION PRN
Status: DISCONTINUED | OUTPATIENT
Start: 2023-06-13 | End: 2023-06-13

## 2023-06-13 RX ORDER — METRONIDAZOLE 500 MG/100ML
500 INJECTION, SOLUTION INTRAVENOUS ONCE
Status: COMPLETED | OUTPATIENT
Start: 2023-06-13 | End: 2023-06-13

## 2023-06-13 RX ORDER — ENOXAPARIN SODIUM 100 MG/ML
40 INJECTION SUBCUTANEOUS DAILY
Status: CANCELLED | OUTPATIENT
Start: 2023-06-13

## 2023-06-13 RX ORDER — INSULIN LISPRO 100 [IU]/ML
0-4 INJECTION, SOLUTION INTRAVENOUS; SUBCUTANEOUS NIGHTLY
Status: DISCONTINUED | OUTPATIENT
Start: 2023-06-13 | End: 2023-06-17 | Stop reason: HOSPADM

## 2023-06-13 RX ORDER — POLYETHYLENE GLYCOL 3350 17 G/17G
17 POWDER, FOR SOLUTION ORAL DAILY PRN
Status: DISCONTINUED | OUTPATIENT
Start: 2023-06-13 | End: 2023-06-17 | Stop reason: HOSPADM

## 2023-06-13 RX ORDER — ACETAMINOPHEN 500 MG
500 TABLET ORAL EVERY 6 HOURS PRN
COMMUNITY

## 2023-06-13 RX ORDER — ONDANSETRON 2 MG/ML
4 INJECTION INTRAMUSCULAR; INTRAVENOUS EVERY 6 HOURS PRN
Status: DISCONTINUED | OUTPATIENT
Start: 2023-06-13 | End: 2023-06-17 | Stop reason: HOSPADM

## 2023-06-13 RX ORDER — ASPIRIN 81 MG/1
81 TABLET, CHEWABLE ORAL
Status: DISCONTINUED | OUTPATIENT
Start: 2023-06-14 | End: 2023-06-17 | Stop reason: HOSPADM

## 2023-06-13 RX ORDER — OMEPRAZOLE 20 MG/1
20 CAPSULE, DELAYED RELEASE ORAL DAILY
COMMUNITY

## 2023-06-13 RX ORDER — SODIUM CHLORIDE 9 MG/ML
INJECTION, SOLUTION INTRAVENOUS CONTINUOUS
Status: DISCONTINUED | OUTPATIENT
Start: 2023-06-13 | End: 2023-06-17 | Stop reason: HOSPADM

## 2023-06-13 RX ORDER — SODIUM CHLORIDE 0.9 % (FLUSH) 0.9 %
5-40 SYRINGE (ML) INJECTION PRN
Status: DISCONTINUED | OUTPATIENT
Start: 2023-06-13 | End: 2023-06-17 | Stop reason: HOSPADM

## 2023-06-13 RX ORDER — INSULIN LISPRO 100 [IU]/ML
0-4 INJECTION, SOLUTION INTRAVENOUS; SUBCUTANEOUS
Status: DISCONTINUED | OUTPATIENT
Start: 2023-06-13 | End: 2023-06-17 | Stop reason: HOSPADM

## 2023-06-13 RX ORDER — SODIUM CHLORIDE 9 MG/ML
INJECTION, SOLUTION INTRAVENOUS PRN
Status: DISCONTINUED | OUTPATIENT
Start: 2023-06-13 | End: 2023-06-17 | Stop reason: HOSPADM

## 2023-06-13 RX ORDER — ACETAMINOPHEN 325 MG/1
650 TABLET ORAL EVERY 6 HOURS PRN
Status: DISCONTINUED | OUTPATIENT
Start: 2023-06-13 | End: 2023-06-17 | Stop reason: HOSPADM

## 2023-06-13 RX ORDER — ALLOPURINOL 100 MG/1
200 TABLET ORAL DAILY
COMMUNITY

## 2023-06-13 RX ORDER — SODIUM CHLORIDE 0.9 % (FLUSH) 0.9 %
10 SYRINGE (ML) INJECTION PRN
Status: DISCONTINUED | OUTPATIENT
Start: 2023-06-13 | End: 2023-06-16

## 2023-06-13 RX ORDER — CHLORAL HYDRATE 500 MG
1000 CAPSULE ORAL 2 TIMES DAILY
COMMUNITY

## 2023-06-13 RX ORDER — HYDRALAZINE HYDROCHLORIDE 25 MG/1
25 TABLET, FILM COATED ORAL 2 TIMES DAILY
Status: DISCONTINUED | OUTPATIENT
Start: 2023-06-13 | End: 2023-06-17 | Stop reason: HOSPADM

## 2023-06-13 RX ORDER — ACETAMINOPHEN 650 MG/1
650 SUPPOSITORY RECTAL EVERY 6 HOURS PRN
Status: DISCONTINUED | OUTPATIENT
Start: 2023-06-13 | End: 2023-06-17 | Stop reason: HOSPADM

## 2023-06-13 RX ORDER — ONDANSETRON 4 MG/1
4 TABLET, ORALLY DISINTEGRATING ORAL EVERY 8 HOURS PRN
Status: DISCONTINUED | OUTPATIENT
Start: 2023-06-13 | End: 2023-06-17 | Stop reason: HOSPADM

## 2023-06-13 RX ORDER — HYDRALAZINE HYDROCHLORIDE 25 MG/1
25 TABLET, FILM COATED ORAL 2 TIMES DAILY
COMMUNITY

## 2023-06-13 RX ORDER — DEXTROSE MONOHYDRATE 100 MG/ML
INJECTION, SOLUTION INTRAVENOUS CONTINUOUS PRN
Status: DISCONTINUED | OUTPATIENT
Start: 2023-06-13 | End: 2023-06-17 | Stop reason: HOSPADM

## 2023-06-13 RX ORDER — PANTOPRAZOLE SODIUM 40 MG/1
40 TABLET, DELAYED RELEASE ORAL
Status: DISCONTINUED | OUTPATIENT
Start: 2023-06-14 | End: 2023-06-17 | Stop reason: HOSPADM

## 2023-06-13 RX ORDER — 0.9 % SODIUM CHLORIDE 0.9 %
100 INTRAVENOUS SOLUTION INTRAVENOUS ONCE
Status: COMPLETED | OUTPATIENT
Start: 2023-06-13 | End: 2023-06-13

## 2023-06-13 RX ORDER — SODIUM CHLORIDE 0.9 % (FLUSH) 0.9 %
5-40 SYRINGE (ML) INJECTION EVERY 12 HOURS SCHEDULED
Status: DISCONTINUED | OUTPATIENT
Start: 2023-06-13 | End: 2023-06-17 | Stop reason: HOSPADM

## 2023-06-13 RX ORDER — 0.9 % SODIUM CHLORIDE 0.9 %
50 INTRAVENOUS SOLUTION INTRAVENOUS ONCE
Status: DISCONTINUED | OUTPATIENT
Start: 2023-06-13 | End: 2023-06-17 | Stop reason: HOSPADM

## 2023-06-13 RX ORDER — ALLOPURINOL 100 MG/1
200 TABLET ORAL DAILY
Status: DISCONTINUED | OUTPATIENT
Start: 2023-06-13 | End: 2023-06-17 | Stop reason: HOSPADM

## 2023-06-13 RX ORDER — ATORVASTATIN CALCIUM 20 MG/1
20 TABLET, FILM COATED ORAL DAILY
Status: DISCONTINUED | OUTPATIENT
Start: 2023-06-13 | End: 2023-06-16

## 2023-06-13 RX ADMIN — SODIUM CHLORIDE: 9 INJECTION, SOLUTION INTRAVENOUS at 15:20

## 2023-06-13 RX ADMIN — CEFTRIAXONE SODIUM 1000 MG: 1 INJECTION, POWDER, FOR SOLUTION INTRAMUSCULAR; INTRAVENOUS at 12:57

## 2023-06-13 RX ADMIN — PIPERACILLIN AND TAZOBACTAM 4500 MG: 4; .5 INJECTION, POWDER, FOR SOLUTION INTRAVENOUS at 15:30

## 2023-06-13 RX ADMIN — IOPAMIDOL 75 ML: 755 INJECTION, SOLUTION INTRAVENOUS at 10:30

## 2023-06-13 RX ADMIN — GADOTERIDOL 20 ML: 279.3 INJECTION, SOLUTION INTRAVENOUS at 18:47

## 2023-06-13 RX ADMIN — SODIUM CHLORIDE 100 ML: 9 INJECTION, SOLUTION INTRAVENOUS at 10:31

## 2023-06-13 RX ADMIN — SODIUM CHLORIDE, PRESERVATIVE FREE 10 ML: 5 INJECTION INTRAVENOUS at 10:30

## 2023-06-13 RX ADMIN — HYDRALAZINE HYDROCHLORIDE 25 MG: 25 TABLET, FILM COATED ORAL at 20:42

## 2023-06-13 RX ADMIN — METOPROLOL TARTRATE 25 MG: 25 TABLET, FILM COATED ORAL at 20:42

## 2023-06-13 RX ADMIN — METRONIDAZOLE 500 MG: 500 INJECTION, SOLUTION INTRAVENOUS at 14:02

## 2023-06-13 RX ADMIN — SODIUM CHLORIDE, PRESERVATIVE FREE 10 ML: 5 INJECTION INTRAVENOUS at 20:43

## 2023-06-13 RX ADMIN — PIPERACILLIN AND TAZOBACTAM 3375 MG: 3; .375 INJECTION, POWDER, LYOPHILIZED, FOR SOLUTION INTRAVENOUS at 20:44

## 2023-06-13 ASSESSMENT — ENCOUNTER SYMPTOMS
NAUSEA: 1
ABDOMINAL PAIN: 1
COUGH: 0
SHORTNESS OF BREATH: 0
NAUSEA: 0
ABDOMINAL DISTENTION: 1
WHEEZING: 0
VOMITING: 1
BACK PAIN: 0

## 2023-06-13 ASSESSMENT — PAIN SCALES - GENERAL: PAINLEVEL_OUTOF10: 4

## 2023-06-13 ASSESSMENT — PAIN DESCRIPTION - PAIN TYPE: TYPE: ACUTE PAIN

## 2023-06-13 ASSESSMENT — PAIN - FUNCTIONAL ASSESSMENT: PAIN_FUNCTIONAL_ASSESSMENT: 0-10

## 2023-06-13 ASSESSMENT — PAIN DESCRIPTION - ORIENTATION: ORIENTATION: RIGHT

## 2023-06-13 ASSESSMENT — LIFESTYLE VARIABLES
HOW OFTEN DO YOU HAVE A DRINK CONTAINING ALCOHOL: NEVER
HOW MANY STANDARD DRINKS CONTAINING ALCOHOL DO YOU HAVE ON A TYPICAL DAY: PATIENT DOES NOT DRINK

## 2023-06-13 ASSESSMENT — PAIN DESCRIPTION - DESCRIPTORS: DESCRIPTORS: STABBING

## 2023-06-13 ASSESSMENT — PAIN DESCRIPTION - LOCATION: LOCATION: FLANK

## 2023-06-13 NOTE — PLAN OF CARE
Problem: Discharge Planning  Goal: Discharge to home or other facility with appropriate resources  6/13/2023 1812 by Marisela Lopez RN  Outcome: Progressing     Problem: Pain  Goal: Verbalizes/displays adequate comfort level or baseline comfort level  6/13/2023 1812 by Marisela Lopez RN  Outcome: Progressing

## 2023-06-13 NOTE — ED PROVIDER NOTES
[address]      Pt Name: Alfredo Ortega  MRN: [id number]  [place] 1940  Date of evaluation: 6/13/23      CHIEF COMPLAINT       Chief Complaint   Patient presents with    Flank Pain     Right side pain off and on for a few days with emesis, has Hx of gallstones. HISTORY OF PRESENT ILLNESS   HPI 81 y.o. male presents with c/o right upper quadrant abdominal pain nausea vomiting. Patient reports that he has had right upper quadrant abdominal pain nausea vomiting over the last months. The pain has been coming and going. He has been evaluated for the symptoms and summary of findings as noted below. He states that over the last few days, his symptoms have returned. He reports about 2-3 episodes of vomiting, pain in the right upper quadrant that radiates to the epigastric area. Reports associated abdominal distention    Pt follows with Dr. Priscilla Brown. On 4/18/23 he had an MRCP that showed  Choledocholithiasis and likely associated focal dilatation of the distalmost common bile duct. No proximal biliary dilatation. *  Cholelithiasis with mild ill-defined arterial enhancement adjacent hepatic parenchyma suggesting possible chronic/indolent cholecystitis in the appropriate setting. Otherwise no overt features of acute cholecystitis. Consider HIDA scan as deemed clinically appropriate. *  Apparent nodular enhancement in the left lower lobe lobe on coronal postcontrast sequences may potentially be artifactual.  Consider short-term follow-up CT chest to better evaluate, however. Review of Systems   Constitutional:  Negative for fever. Respiratory:  Negative for cough. Cardiovascular:  Negative for chest pain. Gastrointestinal:  Positive for abdominal distention, abdominal pain, nausea and vomiting. Genitourinary:  Negative for difficulty urinating. Musculoskeletal:  Negative for back pain. Skin:  Negative for rash. Neurological:  Negative for headaches.

## 2023-06-13 NOTE — H&P
1 - 7 %    Eosinophils % 1 0 - 4 %    Basophils 1 0 - 2 %    Segs Absolute 4.50 1.3 - 9.1 k/uL    Absolute Lymph # 1.80 1.0 - 4.8 k/uL    Absolute Mono # 0.50 0.1 - 1.3 k/uL    Absolute Eos # 0.10 0.0 - 0.4 k/uL    Basophils Absolute 0.00 0.0 - 0.2 k/uL   Comprehensive Metabolic Panel    Collection Time: 06/13/23  9:08 AM   Result Value Ref Range    Glucose 121 (H) 70 - 99 mg/dL    BUN 18 8 - 23 mg/dL    Creatinine 0.76 0.70 - 1.20 mg/dL    Est, Glom Filt Rate >60 >60 mL/min/1.73m2    Calcium 9.6 8.6 - 10.4 mg/dL    Sodium 137 135 - 144 mmol/L    Potassium 4.5 3.7 - 5.3 mmol/L    Chloride 102 98 - 107 mmol/L    CO2 23 20 - 31 mmol/L    Anion Gap 12 9 - 17 mmol/L    Alkaline Phosphatase 65 40 - 129 U/L    ALT 13 5 - 41 U/L    AST 17 <40 U/L    Total Bilirubin 0.4 0.3 - 1.2 mg/dL    Total Protein 6.8 6.4 - 8.3 g/dL    Albumin 4.0 3.5 - 5.2 g/dL   Lipase    Collection Time: 06/13/23  9:08 AM   Result Value Ref Range    Lipase 26 13 - 60 U/L   Protime-INR    Collection Time: 06/13/23  9:08 AM   Result Value Ref Range    Protime 13.1 11.8 - 14.6 sec    INR 1.0    Lactic Acid    Collection Time: 06/13/23  9:08 AM   Result Value Ref Range    Lactic Acid 2.1 0.5 - 2.2 mmol/L   Urinalysis with Reflex to Culture    Collection Time: 06/13/23  9:15 AM    Specimen: Urine   Result Value Ref Range    Color, UA Yellow Yellow    Turbidity UA Clear Clear    Glucose, Ur NEGATIVE NEGATIVE    Bilirubin Urine NEGATIVE NEGATIVE    Ketones, Urine NEGATIVE NEGATIVE    Specific [place], UA 1.016 1.000 - 1.030    Urine Hgb NEGATIVE NEGATIVE    pH, UA 6.5 5.0 - 8.0    Protein, UA NEGATIVE NEGATIVE    Urobilinogen, Urine Normal Normal    Nitrite, Urine NEGATIVE NEGATIVE    Leukocyte Esterase, Urine NEGATIVE NEGATIVE    Urinalysis Comments       Microscopic exam not performed based on chemical results unless requested in original order.        Imaging/Diagnostics:  CT ABDOMEN PELVIS W IV CONTRAST Additional Contrast? None    Result Date:

## 2023-06-13 NOTE — ED NOTES
Report given to MIKA Martinez from [employer]. Report method by phone   The following was reviewed with receiving RN:   Current vital signs:  BP (!) 147/68   Pulse 51   Temp 97.7 °F (36.5 °C) (Oral)   Resp 18   Ht 5' 6\" (1.676 m)   Wt 185 lb (83.9 kg)   SpO2 96%   BMI 29.86 kg/m²                MEWS Score: 1     Any medication or safety alerts were reviewed. Any pending diagnostics and notifications were also reviewed, as well as any safety concerns or issues, abnormal labs, abnormal imaging, and abnormal assessment findings. Questions were answered.           Lucio Johnson RN  06/13/23 [id number]

## 2023-06-14 LAB
ALBUMIN SERPL-MCNC: 3.8 G/DL (ref 3.5–5.2)
ALP SERPL-CCNC: 60 U/L (ref 40–129)
ALT SERPL-CCNC: 11 U/L (ref 5–41)
ANION GAP SERPL CALCULATED.3IONS-SCNC: 12 MMOL/L (ref 9–17)
AST SERPL-CCNC: 15 U/L
BASOPHILS # BLD: 0 K/UL (ref 0–0.2)
BASOPHILS NFR BLD: 1 % (ref 0–2)
BILIRUB SERPL-MCNC: 0.6 MG/DL (ref 0.3–1.2)
BUN SERPL-MCNC: 14 MG/DL (ref 8–23)
CALCIUM SERPL-MCNC: 9 MG/DL (ref 8.6–10.4)
CHLORIDE SERPL-SCNC: 101 MMOL/L (ref 98–107)
CO2 SERPL-SCNC: 24 MMOL/L (ref 20–31)
CREAT SERPL-MCNC: 0.88 MG/DL (ref 0.7–1.2)
EOSINOPHIL # BLD: 0.1 K/UL (ref 0–0.4)
EOSINOPHILS RELATIVE PERCENT: 1 % (ref 0–4)
ERYTHROCYTE [DISTWIDTH] IN BLOOD BY AUTOMATED COUNT: 14.8 % (ref 11.5–14.9)
GFR SERPL CREATININE-BSD FRML MDRD: >60 ML/MIN/1.73M2
GLUCOSE BLD-MCNC: 110 MG/DL (ref 75–110)
GLUCOSE BLD-MCNC: 160 MG/DL (ref 75–110)
GLUCOSE BLD-MCNC: 169 MG/DL (ref 75–110)
GLUCOSE BLD-MCNC: 176 MG/DL (ref 75–110)
GLUCOSE SERPL-MCNC: 107 MG/DL (ref 70–99)
HCT VFR BLD AUTO: 37.5 % (ref 41–53)
HGB BLD-MCNC: 11.7 G/DL (ref 13.5–17.5)
LIPASE SERPL-CCNC: 19 U/L (ref 13–60)
LYMPHOCYTES # BLD: 34 % (ref 24–44)
LYMPHOCYTES NFR BLD: 1.6 K/UL (ref 1–4.8)
MCH RBC QN AUTO: 28.2 PG (ref 26–34)
MCHC RBC AUTO-ENTMCNC: 31.2 G/DL (ref 31–37)
MCV RBC AUTO: 90.3 FL (ref 80–100)
MONOCYTES NFR BLD: 0.4 K/UL (ref 0.1–1.3)
MONOCYTES NFR BLD: 8 % (ref 1–7)
NEUTROPHILS NFR BLD: 56 % (ref 36–66)
NEUTS SEG NFR BLD: 2.7 K/UL (ref 1.3–9.1)
PLATELET # BLD AUTO: 203 K/UL (ref 150–450)
PMV BLD AUTO: 8.8 FL (ref 6–12)
POTASSIUM SERPL-SCNC: 4.2 MMOL/L (ref 3.7–5.3)
PROT SERPL-MCNC: 6.4 G/DL (ref 6.4–8.3)
RBC # BLD AUTO: 4.16 M/UL (ref 4.5–5.9)
SODIUM SERPL-SCNC: 137 MMOL/L (ref 135–144)
WBC OTHER # BLD: 4.8 K/UL (ref 3.5–11)

## 2023-06-14 PROCEDURE — 6360000002 HC RX W HCPCS

## 2023-06-14 PROCEDURE — 97161 PT EVAL LOW COMPLEX 20 MIN: CPT

## 2023-06-14 PROCEDURE — 6370000000 HC RX 637 (ALT 250 FOR IP)

## 2023-06-14 PROCEDURE — 36415 COLL VENOUS BLD VENIPUNCTURE: CPT

## 2023-06-14 PROCEDURE — 99222 1ST HOSP IP/OBS MODERATE 55: CPT | Performed by: INTERNAL MEDICINE

## 2023-06-14 PROCEDURE — 99232 SBSQ HOSP IP/OBS MODERATE 35: CPT | Performed by: INTERNAL MEDICINE

## 2023-06-14 PROCEDURE — 2580000003 HC RX 258

## 2023-06-14 PROCEDURE — 85027 COMPLETE CBC AUTOMATED: CPT

## 2023-06-14 PROCEDURE — 82947 ASSAY GLUCOSE BLOOD QUANT: CPT

## 2023-06-14 PROCEDURE — 80053 COMPREHEN METABOLIC PANEL: CPT

## 2023-06-14 PROCEDURE — 1200000000 HC SEMI PRIVATE

## 2023-06-14 PROCEDURE — 83690 ASSAY OF LIPASE: CPT

## 2023-06-14 PROCEDURE — 97165 OT EVAL LOW COMPLEX 30 MIN: CPT

## 2023-06-14 RX ADMIN — HYDRALAZINE HYDROCHLORIDE 25 MG: 25 TABLET, FILM COATED ORAL at 10:48

## 2023-06-14 RX ADMIN — PIPERACILLIN AND TAZOBACTAM 3375 MG: 3; .375 INJECTION, POWDER, LYOPHILIZED, FOR SOLUTION INTRAVENOUS at 15:10

## 2023-06-14 RX ADMIN — ATORVASTATIN CALCIUM 20 MG: 20 TABLET, FILM COATED ORAL at 10:48

## 2023-06-14 RX ADMIN — PANTOPRAZOLE SODIUM 40 MG: 40 TABLET, DELAYED RELEASE ORAL at 05:08

## 2023-06-14 RX ADMIN — ALLOPURINOL 200 MG: 100 TABLET ORAL at 10:48

## 2023-06-14 RX ADMIN — PIPERACILLIN AND TAZOBACTAM 3375 MG: 3; .375 INJECTION, POWDER, LYOPHILIZED, FOR SOLUTION INTRAVENOUS at 05:08

## 2023-06-14 RX ADMIN — PIPERACILLIN AND TAZOBACTAM 3375 MG: 3; .375 INJECTION, POWDER, LYOPHILIZED, FOR SOLUTION INTRAVENOUS at 20:05

## 2023-06-14 RX ADMIN — HYDRALAZINE HYDROCHLORIDE 25 MG: 25 TABLET, FILM COATED ORAL at 20:03

## 2023-06-14 ASSESSMENT — ENCOUNTER SYMPTOMS
VOICE CHANGE: 0
NAUSEA: 1
SHORTNESS OF BREATH: 0
ABDOMINAL PAIN: 1
CHOKING: 0
TROUBLE SWALLOWING: 0
BACK PAIN: 0
VOMITING: 1
CONSTIPATION: 1
WHEEZING: 0
SORE THROAT: 0
APNEA: 0
COUGH: 0

## 2023-06-14 ASSESSMENT — PAIN SCALES - GENERAL: PAINLEVEL_OUTOF10: 0

## 2023-06-14 NOTE — CARE COORDINATION
Case Management Assessment  Initial Evaluation    Date/Time of Evaluation: 6/14/2023 3:06 PM  Assessment Completed by: Mohinder Mcgarry RN    If patient is discharged prior to next notation, then this note serves as note for discharge by case management. Patient Name: Rosaline Hagan                   YOB: 1940  Diagnosis: Chronic cholecystitis [K81.1]  Choledocholithiasis [K80.50]  Cholecystitis [K81.9]                   Date / Time: 6/13/2023  8:26 AM    Patient Admission Status: Inpatient   Readmission Risk (Low < 19, Mod (19-27), High > 27): Readmission Risk Score: 8.3    Current PCP: David Cam MD  PCP verified by CM? Yes    Chart Reviewed: Yes      History Provided by: Patient  Patient Orientation: Alert and Oriented    Patient Cognition: Alert    Hospitalization in the last 30 days (Readmission):  Yes    If yes, Readmission Assessment in CM Navigator will be completed. Advance Directives:      Code Status: Full Code   Patient's Primary Decision Maker is: Patient Declined (Legal Next of Kin Remains as Decision Maker)      Discharge Planning:    Patient lives with: Alone Type of Home: House  Primary Care Giver: Self  Patient Support Systems include: Family Members   Current Financial resources: Medicare  Current community resources:    Current services prior to admission: None            Current DME:              Type of Home Care services:  None    ADLS  Prior functional level: Independent in ADLs/IADLs  Current functional level: Independent in ADLs/IADLs    PT AM-PAC:   /24  OT AM-PAC: 21 /24    Family can provide assistance at DC: Yes  Would you like Case Management to discuss the discharge plan with any other family members/significant others, and if so, who?  Yes  Plans to Return to Present Housing: Yes  Other Identified Issues/Barriers to RETURNING to current housing: no  Potential Assistance needed at discharge: N/A            Potential DME:    Patient expects to discharge to:

## 2023-06-14 NOTE — CONSULTS
GI Consult Note:    Name: Ahsan Colon  MRN: [id number]     Acct: [de-identified]  Room: 2068/2068-01    Admit Date: 6/13/2023  PCP: Lu Kim MD    Physician Requesting Consult: Chris Olvera MD     Reason for Consult: Abdominal pain, nausea. Chief Complaint:     Chief Complaint   Patient presents with    Flank Pain     Right side pain off and on for a few days with emesis, has Hx of gallstones. History Obtained From:     patient, electronic medical record, patient's daughter    History of Present Illness:      Ahsan Colon is a Mediterranean 81 y.o.  male who presents with Flank Pain (Right side pain off and on for a few days with emesis, has Hx of gallstones.  )      Symptoms:  [facility].    During this visit history discussed with the patient and patient's daughter bedside. Apparently patient was having vague upper abdominal discomfort, indigestion feeling, nausea. Patient had similar symptoms of upper abdominal pain, nausea vomiting intermittently since October 2022. He had 2-3 episodes prior to this admission. Each episode lasted few days. In between the episodes he did not have symptoms. At one time in the past his transaminases were transiently elevated up to 300 range. However, transaminases became normal following the episode of abdominal pain. At present transaminases are normal.  He had imaging done including CT scan and MRI that revealed cholelithiasis, possible stone in the CBD. At present he does not have pain, nausea. He is comfortable. 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 As far as cardiovascular issue is concerned, he appears to be asymptomatic. No shortness of breath cough, swelling of the lower extremities etc.  He has a good appetite and there is no dysphagia. No weight loss. He does have mild constipation.   He was followed by Dr. Emperatriz Cunningham, colorectal surgeon
General Surgery   Consultation        Pt Name: Erin Vicente  MRN: [id number]  [place]: 1940  Date of evaluation: 6/13/2023  Primary Care Physician: Beth Flores MD   Patient evaluated at the request of  Dr. Vlad Up  Reason for evaluation: Abnormal CT abdomen and pelvis    SUBJECTIVE:   History of Chief Complaint:    Erin Vicente is a 81 y.o. male who presents with right upper quadrant abdominal pain. Some bloating. Nausea off-and-on. Some emesis. ER work-up reviewed. Previous MRCP reviewed. There was evidence of cholelithiasis and choledocholithiasis. Mild biliary ductal dilatation. Previous inguinal hernia repair and CABG. History of robotic prostatectomy. Takes aspirin which is on hold. Past Medical History   has a past medical history of Diabetes ([address] Edema, Essential hypertension, benign, Gout, Heart burn, Herpes zoster without mention of complication, Hypertension, Neoplasm of uncertain behavior of prostate, Osteoarthritis, Pain in joint, lower leg, Psychogenic pain, site unspecified, Sprain of ribs, Type 2 diabetes mellitus ([address] and Ventral hernia, unspecified, without mention of obstruction or gangrene. Past Surgical History   has a past surgical history that includes hernia repair; Knee arthroscopy; Cardiac valve replacement (01/05/2017); Cardiac surgery (01/05/2017); Vein Surgery (Left); Knee arthroscopy (Bilateral); Prostate surgery; and hernia repair (Bilateral).     Medications    Current Facility-Administered Medications:     allopurinol (ZYLOPRIM) tablet 200 mg, 200 mg, Oral, Daily, Faith Meier MD    [Held by provider] aspirin chewable tablet 81 mg, 81 mg, Oral, Daily with breakfast, Faith Meier MD    atorvastatin (LIPITOR) tablet 20 mg, 20 mg, Oral, Daily, Faith Meier MD    hydrALAZINE (APRESOLINE) tablet 25 mg, 25 mg, Oral, BID, Faith Meier MD    metoprolol tartrate (LOPRESSOR) tablet 25 mg, 25 mg, Oral, BID, Vince Philippe MD    [START ON 6/14/2023]
Reported on 6/13/2023    Historical Provider, MD   aspirin 81 MG tablet Take 1 tablet by mouth daily (with breakfast). 12/16/14   Jeanne Johnson MD       Current Medications: Scheduled Meds:   allopurinol  200 mg Oral Daily    [Held by provider] aspirin  81 mg Oral Daily with breakfast    atorvastatin  20 mg Oral Daily    hydrALAZINE  25 mg Oral BID    metoprolol tartrate  25 mg Oral BID    pantoprazole  40 mg Oral QAM AC    sodium chloride flush  5-40 mL IntraVENous 2 times per day    piperacillin-tazobactam  3,375 mg IntraVENous Q8H    insulin lispro  0-4 Units SubCUTAneous TID WC    insulin lispro  0-4 Units SubCUTAneous Nightly    sodium chloride  50 mL IntraVENous Once     Continuous Infusions:   sodium chloride      dextrose      sodium chloride 50 mL/hr at 06/13/23 1520     PRN Meds:.sodium chloride flush, sodium chloride, ondansetron **OR** ondansetron, polyethylene glycol, acetaminophen **OR** acetaminophen, glucose, dextrose bolus **OR** dextrose bolus, glucagon (rDNA), dextrose, sodium chloride flush     Allergies:  Lisinopril, Lisinopril, and Amlodipine    Social History:   reports that he has quit smoking. He has never used smokeless tobacco. He reports that he does not drink alcohol and does not use drugs. Family History: family history includes Arthritis in his mother and another family member; Cancer in his father and mother; Diabetes in his brother and another family member.      Review of Systems   CONSTITUTIONAL:  negative for fevers, chills, fatigue and malaise    EYES:  negative for discharge    HEENT:  negative for epistaxis and sore throat    RESPIRATORY:  negative for cough, shortness of breath, wheezing    CARDIOVASCULAR:  negative for chest pain, palpitations, syncope, edema    GASTROINTESTINAL:  positive for nausea, vomiting, no diarrhea, no constipation, abdominal pain    GENITOURINARY:  negative for incontinence    MUSCULOSKELETAL:  negative for neck or back pain    NEUROLOGICAL:

## 2023-06-14 NOTE — PLAN OF CARE
Problem: Discharge Planning  Goal: Discharge to home or other facility with appropriate resources  6/14/2023 1804 by Verónica Mcclendon RN  Outcome: Progressing     Problem: Pain  Goal: Verbalizes/displays adequate comfort level or baseline comfort level  6/14/2023 1804 by Verónica Mcclendon RN  Outcome: Progressing     Problem: Chronic Conditions and Co-morbidities  Goal: Patient's chronic conditions and co-morbidity symptoms are monitored and maintained or improved  6/14/2023 1804 by Verónica Mcclendon RN  Outcome: Progressing

## 2023-06-15 ENCOUNTER — APPOINTMENT (OUTPATIENT)
Dept: GENERAL RADIOLOGY | Age: 83
DRG: 418 | End: 2023-06-15
Payer: COMMERCIAL

## 2023-06-15 LAB
GLUCOSE BLD-MCNC: 112 MG/DL (ref 75–110)
GLUCOSE BLD-MCNC: 130 MG/DL (ref 75–110)
GLUCOSE BLD-MCNC: 131 MG/DL (ref 75–110)
GLUCOSE BLD-MCNC: 140 MG/DL (ref 75–110)
GLUCOSE BLD-MCNC: 192 MG/DL (ref 75–110)

## 2023-06-15 PROCEDURE — 3700000000 HC ANESTHESIA ATTENDED CARE: Performed by: INTERNAL MEDICINE

## 2023-06-15 PROCEDURE — 0F798ZZ DILATION OF COMMON BILE DUCT, VIA NATURAL OR ARTIFICIAL OPENING ENDOSCOPIC: ICD-10-PCS | Performed by: INTERNAL MEDICINE

## 2023-06-15 PROCEDURE — 7100000000 HC PACU RECOVERY - FIRST 15 MIN: Performed by: INTERNAL MEDICINE

## 2023-06-15 PROCEDURE — 6360000002 HC RX W HCPCS

## 2023-06-15 PROCEDURE — 99232 SBSQ HOSP IP/OBS MODERATE 35: CPT | Performed by: INTERNAL MEDICINE

## 2023-06-15 PROCEDURE — 2709999900 HC NON-CHARGEABLE SUPPLY: Performed by: INTERNAL MEDICINE

## 2023-06-15 PROCEDURE — 6370000000 HC RX 637 (ALT 250 FOR IP): Performed by: INTERNAL MEDICINE

## 2023-06-15 PROCEDURE — 3609014900 HC ERCP W/SPHINCTEROTOMY &/OR PAPILLOTOMY: Performed by: INTERNAL MEDICINE

## 2023-06-15 PROCEDURE — BF111ZZ FLUOROSCOPY OF BILIARY AND PANCREATIC DUCTS USING LOW OSMOLAR CONTRAST: ICD-10-PCS | Performed by: INTERNAL MEDICINE

## 2023-06-15 PROCEDURE — C1769 GUIDE WIRE: HCPCS | Performed by: INTERNAL MEDICINE

## 2023-06-15 PROCEDURE — 2580000003 HC RX 258

## 2023-06-15 PROCEDURE — 82947 ASSAY GLUCOSE BLOOD QUANT: CPT

## 2023-06-15 PROCEDURE — 0F778ZZ DILATION OF COMMON HEPATIC DUCT, VIA NATURAL OR ARTIFICIAL OPENING ENDOSCOPIC: ICD-10-PCS | Performed by: INTERNAL MEDICINE

## 2023-06-15 PROCEDURE — 6360000002 HC RX W HCPCS: Performed by: INTERNAL MEDICINE

## 2023-06-15 PROCEDURE — 2580000003 HC RX 258: Performed by: INTERNAL MEDICINE

## 2023-06-15 PROCEDURE — 7100000001 HC PACU RECOVERY - ADDTL 15 MIN: Performed by: INTERNAL MEDICINE

## 2023-06-15 PROCEDURE — 2720000010 HC SURG SUPPLY STERILE: Performed by: INTERNAL MEDICINE

## 2023-06-15 PROCEDURE — 1200000000 HC SEMI PRIVATE

## 2023-06-15 PROCEDURE — 3209999900 FLUORO FOR SURGICAL PROCEDURES

## 2023-06-15 PROCEDURE — 3700000001 HC ADD 15 MINUTES (ANESTHESIA): Performed by: INTERNAL MEDICINE

## 2023-06-15 PROCEDURE — 6360000004 HC RX CONTRAST MEDICATION: Performed by: INTERNAL MEDICINE

## 2023-06-15 RX ORDER — SODIUM CHLORIDE 0.9 % (FLUSH) 0.9 %
5-40 SYRINGE (ML) INJECTION PRN
Status: DISCONTINUED | OUTPATIENT
Start: 2023-06-15 | End: 2023-06-15 | Stop reason: HOSPADM

## 2023-06-15 RX ORDER — LABETALOL HYDROCHLORIDE 5 MG/ML
10 INJECTION, SOLUTION INTRAVENOUS EVERY 6 HOURS PRN
Status: DISCONTINUED | OUTPATIENT
Start: 2023-06-15 | End: 2023-06-15

## 2023-06-15 RX ORDER — FENTANYL CITRATE 0.05 MG/ML
50 INJECTION, SOLUTION INTRAMUSCULAR; INTRAVENOUS EVERY 5 MIN PRN
Status: DISCONTINUED | OUTPATIENT
Start: 2023-06-15 | End: 2023-06-15 | Stop reason: HOSPADM

## 2023-06-15 RX ORDER — SODIUM CHLORIDE 0.9 % (FLUSH) 0.9 %
5-40 SYRINGE (ML) INJECTION EVERY 12 HOURS SCHEDULED
Status: DISCONTINUED | OUTPATIENT
Start: 2023-06-15 | End: 2023-06-15 | Stop reason: HOSPADM

## 2023-06-15 RX ORDER — FENTANYL CITRATE 0.05 MG/ML
25 INJECTION, SOLUTION INTRAMUSCULAR; INTRAVENOUS EVERY 5 MIN PRN
Status: DISCONTINUED | OUTPATIENT
Start: 2023-06-15 | End: 2023-06-15 | Stop reason: HOSPADM

## 2023-06-15 RX ORDER — DIPHENHYDRAMINE HYDROCHLORIDE 50 MG/ML
12.5 INJECTION INTRAMUSCULAR; INTRAVENOUS
Status: DISCONTINUED | OUTPATIENT
Start: 2023-06-15 | End: 2023-06-15 | Stop reason: HOSPADM

## 2023-06-15 RX ORDER — HYDRALAZINE HYDROCHLORIDE 20 MG/ML
10 INJECTION INTRAMUSCULAR; INTRAVENOUS EVERY 6 HOURS PRN
Status: DISCONTINUED | OUTPATIENT
Start: 2023-06-15 | End: 2023-06-15

## 2023-06-15 RX ORDER — HYDRALAZINE HYDROCHLORIDE 20 MG/ML
10 INJECTION INTRAMUSCULAR; INTRAVENOUS EVERY 6 HOURS PRN
Status: DISCONTINUED | OUTPATIENT
Start: 2023-06-15 | End: 2023-06-17

## 2023-06-15 RX ORDER — SODIUM CHLORIDE 9 MG/ML
INJECTION, SOLUTION INTRAVENOUS PRN
Status: DISCONTINUED | OUTPATIENT
Start: 2023-06-15 | End: 2023-06-15 | Stop reason: HOSPADM

## 2023-06-15 RX ORDER — ONDANSETRON 2 MG/ML
4 INJECTION INTRAMUSCULAR; INTRAVENOUS
Status: DISCONTINUED | OUTPATIENT
Start: 2023-06-15 | End: 2023-06-15 | Stop reason: HOSPADM

## 2023-06-15 RX ADMIN — ATORVASTATIN CALCIUM 20 MG: 20 TABLET, FILM COATED ORAL at 15:48

## 2023-06-15 RX ADMIN — PIPERACILLIN AND TAZOBACTAM 3375 MG: 3; .375 INJECTION, POWDER, LYOPHILIZED, FOR SOLUTION INTRAVENOUS at 15:50

## 2023-06-15 RX ADMIN — METOPROLOL TARTRATE 25 MG: 25 TABLET, FILM COATED ORAL at 21:04

## 2023-06-15 RX ADMIN — PIPERACILLIN AND TAZOBACTAM 3375 MG: 3; .375 INJECTION, POWDER, LYOPHILIZED, FOR SOLUTION INTRAVENOUS at 21:01

## 2023-06-15 RX ADMIN — ACETAMINOPHEN 650 MG: 325 TABLET ORAL at 15:46

## 2023-06-15 RX ADMIN — HYDRALAZINE HYDROCHLORIDE 10 MG: 20 INJECTION INTRAMUSCULAR; INTRAVENOUS at 08:10

## 2023-06-15 RX ADMIN — ALLOPURINOL 200 MG: 100 TABLET ORAL at 15:47

## 2023-06-15 RX ADMIN — ONDANSETRON 4 MG: 2 INJECTION INTRAMUSCULAR; INTRAVENOUS at 17:21

## 2023-06-15 RX ADMIN — PIPERACILLIN AND TAZOBACTAM 3375 MG: 3; .375 INJECTION, POWDER, LYOPHILIZED, FOR SOLUTION INTRAVENOUS at 06:02

## 2023-06-15 ASSESSMENT — PAIN - FUNCTIONAL ASSESSMENT: PAIN_FUNCTIONAL_ASSESSMENT: 0-10

## 2023-06-15 ASSESSMENT — PAIN SCALES - GENERAL: PAINLEVEL_OUTOF10: 4

## 2023-06-15 ASSESSMENT — PAIN DESCRIPTION - DESCRIPTORS
DESCRIPTORS: PRESSURE
DESCRIPTORS: ACHING

## 2023-06-15 ASSESSMENT — PAIN DESCRIPTION - LOCATION: LOCATION: ABDOMEN;HEAD

## 2023-06-15 ASSESSMENT — ENCOUNTER SYMPTOMS
COUGH: 0
BACK PAIN: 0
ABDOMINAL PAIN: 1
SHORTNESS OF BREATH: 0
WHEEZING: 0

## 2023-06-15 NOTE — OP NOTE
ERCP      Patient:   Yoni Juarez   :    1940  Acc#:    [id number]   Referring/PCP: Jacki Durham MD  Facility:   [facility]  Date:     6/15/2023   Endoscopist: Bee Daniel MD   Procedure: ERCP with  cholangiogram, sphincterotomy, multiple CBD stones retrieval        Indication: Choledocholithiasis    Anesthesia:  General    Description of Procedure:  Prior to the procedure, a history and physical exam was performed and informed consent was obtained. The risks were discussed including pancreatitis, bleeding, and perforation. After the patient was placed in the prone position eved, the therapeutic duodenoscope scope was inserted into the mouth and advanced to the second portion of the duodenum allowing the papilla to be visualized. Using the a wire guided approach with the sphincterotome, the CBD was cannulated and a cholangiogram was performed. During the procedure, the patient's blood pressure, pulse and oxygen saturation remained stable and documented. No unusual events occurred during the procedure. Patient was transferred to recovery room and will be discharged when criteria is met. Findings: Papilla looks normal.  Able to selectively cannulate CBD. Initial cholangiogram revealed filling defects suggestive of stones. Subsequently with [email or web address] sphincterotome, 1 cm long sphincterotomy performed. By using 1.2 cm balloon, common hepatic duct, common bile duct seeped several times and retrieved multiple pigmented stones and one of the stones was as big as 1 cm. At the end cholangiogram did not reveal filling defects. Cystic duct was patent. During this procedure pancreatic duct was not manipulated or injected. The duodenoscope was removed and the patient tolerated the procedure well. Plan: We will watch the patient closely for 4 to 6 hours.   And if stable will feed the patient        Electronically signed by Bee Daniel,

## 2023-06-15 NOTE — CARE COORDINATION
ONGOING DISCHARGE PLAN:    Patient is alert and oriented x4. Spoke with patient regarding discharge plan and patient confirms that plan is still to return to home alone. Denies VNS at this time. Pt. Remains on IV Zosyn. Plan is for ERCP today. Per Surgery, May need Kadi at some point. Denies needs. Will continue to follow for additional discharge needs. If patient is discharged prior to next notation, then this note serves as note for discharge by case management.     Electronically signed by Abhijit Gudino RN on 6/15/2023 at 9:33 AM

## 2023-06-16 LAB
ALBUMIN SERPL-MCNC: 3.6 G/DL (ref 3.5–5.2)
ALP SERPL-CCNC: 60 U/L (ref 40–129)
ALT SERPL-CCNC: 14 U/L (ref 5–41)
ANION GAP SERPL CALCULATED.3IONS-SCNC: 10 MMOL/L (ref 9–17)
AST SERPL-CCNC: 19 U/L
BASOPHILS # BLD: 0 K/UL (ref 0–0.2)
BASOPHILS NFR BLD: 0 % (ref 0–2)
BILIRUB SERPL-MCNC: 0.6 MG/DL (ref 0.3–1.2)
BUN SERPL-MCNC: 8 MG/DL (ref 8–23)
CALCIUM SERPL-MCNC: 9 MG/DL (ref 8.6–10.4)
CHLORIDE SERPL-SCNC: 108 MMOL/L (ref 98–107)
CO2 SERPL-SCNC: 24 MMOL/L (ref 20–31)
CREAT SERPL-MCNC: 0.98 MG/DL (ref 0.7–1.2)
EOSINOPHIL # BLD: 0.1 K/UL (ref 0–0.4)
EOSINOPHILS RELATIVE PERCENT: 1 % (ref 0–4)
ERYTHROCYTE [DISTWIDTH] IN BLOOD BY AUTOMATED COUNT: 15.8 % (ref 11.5–14.9)
GFR SERPL CREATININE-BSD FRML MDRD: >60 ML/MIN/1.73M2
GLUCOSE BLD-MCNC: 104 MG/DL (ref 75–110)
GLUCOSE BLD-MCNC: 135 MG/DL (ref 75–110)
GLUCOSE BLD-MCNC: 137 MG/DL (ref 75–110)
GLUCOSE BLD-MCNC: 137 MG/DL (ref 75–110)
GLUCOSE BLD-MCNC: 87 MG/DL (ref 75–110)
GLUCOSE SERPL-MCNC: 104 MG/DL (ref 70–99)
HCT VFR BLD AUTO: 39.6 % (ref 41–53)
HGB BLD-MCNC: 12 G/DL (ref 13.5–17.5)
LYMPHOCYTES # BLD: 28 % (ref 24–44)
LYMPHOCYTES NFR BLD: 1.7 K/UL (ref 1–4.8)
MCH RBC QN AUTO: 28.4 PG (ref 26–34)
MCHC RBC AUTO-ENTMCNC: 30.4 G/DL (ref 31–37)
MCV RBC AUTO: 93.5 FL (ref 80–100)
MONOCYTES NFR BLD: 0.5 K/UL (ref 0.1–1.3)
MONOCYTES NFR BLD: 8 % (ref 1–7)
NEUTROPHILS NFR BLD: 63 % (ref 36–66)
NEUTS SEG NFR BLD: 3.8 K/UL (ref 1.3–9.1)
PLATELET # BLD AUTO: 218 K/UL (ref 150–450)
PMV BLD AUTO: 8.4 FL (ref 6–12)
POTASSIUM SERPL-SCNC: 4.2 MMOL/L (ref 3.7–5.3)
PROT SERPL-MCNC: 6.4 G/DL (ref 6.4–8.3)
RBC # BLD AUTO: 4.23 M/UL (ref 4.5–5.9)
REASON FOR REJECTION: NORMAL
SODIUM SERPL-SCNC: 142 MMOL/L (ref 135–144)
SPECIMEN SOURCE: NORMAL
WBC OTHER # BLD: 6 K/UL (ref 3.5–11)
ZZ NTE CLEAN UP: ORDERED TEST: NORMAL

## 2023-06-16 PROCEDURE — 2580000003 HC RX 258: Performed by: SURGERY

## 2023-06-16 PROCEDURE — 3600000009 HC SURGERY ROBOT BASE: Performed by: SURGERY

## 2023-06-16 PROCEDURE — 6360000002 HC RX W HCPCS: Performed by: INTERNAL MEDICINE

## 2023-06-16 PROCEDURE — 3600000019 HC SURGERY ROBOT ADDTL 15MIN: Performed by: SURGERY

## 2023-06-16 PROCEDURE — 99232 SBSQ HOSP IP/OBS MODERATE 35: CPT | Performed by: INTERNAL MEDICINE

## 2023-06-16 PROCEDURE — 3700000001 HC ADD 15 MINUTES (ANESTHESIA): Performed by: SURGERY

## 2023-06-16 PROCEDURE — 3700000000 HC ANESTHESIA ATTENDED CARE: Performed by: SURGERY

## 2023-06-16 PROCEDURE — 6370000000 HC RX 637 (ALT 250 FOR IP): Performed by: SURGERY

## 2023-06-16 PROCEDURE — 85027 COMPLETE CBC AUTOMATED: CPT

## 2023-06-16 PROCEDURE — 0FT44ZZ RESECTION OF GALLBLADDER, PERCUTANEOUS ENDOSCOPIC APPROACH: ICD-10-PCS | Performed by: SURGERY

## 2023-06-16 PROCEDURE — 7100000001 HC PACU RECOVERY - ADDTL 15 MIN: Performed by: SURGERY

## 2023-06-16 PROCEDURE — 36415 COLL VENOUS BLD VENIPUNCTURE: CPT

## 2023-06-16 PROCEDURE — 1200000000 HC SEMI PRIVATE

## 2023-06-16 PROCEDURE — 88304 TISSUE EXAM BY PATHOLOGIST: CPT

## 2023-06-16 PROCEDURE — 2500000003 HC RX 250 WO HCPCS: Performed by: SURGERY

## 2023-06-16 PROCEDURE — 82947 ASSAY GLUCOSE BLOOD QUANT: CPT

## 2023-06-16 PROCEDURE — 6370000000 HC RX 637 (ALT 250 FOR IP): Performed by: INTERNAL MEDICINE

## 2023-06-16 PROCEDURE — 6360000002 HC RX W HCPCS: Performed by: SURGERY

## 2023-06-16 PROCEDURE — APPSS30 APP SPLIT SHARED TIME 16-30 MINUTES: Performed by: NURSE PRACTITIONER

## 2023-06-16 PROCEDURE — 8E0W4CZ ROBOTIC ASSISTED PROCEDURE OF TRUNK REGION, PERCUTANEOUS ENDOSCOPIC APPROACH: ICD-10-PCS | Performed by: SURGERY

## 2023-06-16 PROCEDURE — 2580000003 HC RX 258: Performed by: INTERNAL MEDICINE

## 2023-06-16 PROCEDURE — 6360000002 HC RX W HCPCS: Performed by: ANESTHESIOLOGY

## 2023-06-16 PROCEDURE — C1889 IMPLANT/INSERT DEVICE, NOC: HCPCS | Performed by: SURGERY

## 2023-06-16 PROCEDURE — S2900 ROBOTIC SURGICAL SYSTEM: HCPCS | Performed by: SURGERY

## 2023-06-16 PROCEDURE — 7100000000 HC PACU RECOVERY - FIRST 15 MIN: Performed by: SURGERY

## 2023-06-16 PROCEDURE — 2709999900 HC NON-CHARGEABLE SUPPLY: Performed by: SURGERY

## 2023-06-16 PROCEDURE — 2720000010 HC SURG SUPPLY STERILE: Performed by: SURGERY

## 2023-06-16 PROCEDURE — 80053 COMPREHEN METABOLIC PANEL: CPT

## 2023-06-16 DEVICE — CLIP INT M L POLYMER LOK LIG HEM O LOK: Type: IMPLANTABLE DEVICE | Site: BILE DUCT | Status: FUNCTIONAL

## 2023-06-16 RX ORDER — SODIUM CHLORIDE 9 MG/ML
INJECTION, SOLUTION INTRAVENOUS PRN
Status: DISCONTINUED | OUTPATIENT
Start: 2023-06-16 | End: 2023-06-16 | Stop reason: HOSPADM

## 2023-06-16 RX ORDER — SODIUM CHLORIDE 0.9 % (FLUSH) 0.9 %
5-40 SYRINGE (ML) INJECTION PRN
Status: DISCONTINUED | OUTPATIENT
Start: 2023-06-16 | End: 2023-06-16 | Stop reason: HOSPADM

## 2023-06-16 RX ORDER — FENTANYL CITRATE 0.05 MG/ML
25 INJECTION, SOLUTION INTRAMUSCULAR; INTRAVENOUS
Status: DISCONTINUED | OUTPATIENT
Start: 2023-06-16 | End: 2023-06-17 | Stop reason: HOSPADM

## 2023-06-16 RX ORDER — OXYCODONE HYDROCHLORIDE AND ACETAMINOPHEN 5; 325 MG/1; MG/1
1 TABLET ORAL EVERY 4 HOURS PRN
Status: DISCONTINUED | OUTPATIENT
Start: 2023-06-16 | End: 2023-06-17 | Stop reason: HOSPADM

## 2023-06-16 RX ORDER — DIPHENHYDRAMINE HYDROCHLORIDE 50 MG/ML
12.5 INJECTION INTRAMUSCULAR; INTRAVENOUS
Status: DISCONTINUED | OUTPATIENT
Start: 2023-06-16 | End: 2023-06-16 | Stop reason: HOSPADM

## 2023-06-16 RX ORDER — ACETAMINOPHEN 325 MG/1
650 TABLET ORAL EVERY 4 HOURS PRN
Status: DISCONTINUED | OUTPATIENT
Start: 2023-06-16 | End: 2023-06-17 | Stop reason: HOSPADM

## 2023-06-16 RX ORDER — BUPIVACAINE HYDROCHLORIDE 5 MG/ML
INJECTION, SOLUTION EPIDURAL; INTRACAUDAL PRN
Status: DISCONTINUED | OUTPATIENT
Start: 2023-06-16 | End: 2023-06-16 | Stop reason: ALTCHOICE

## 2023-06-16 RX ORDER — ONDANSETRON 4 MG/1
TABLET, FILM COATED ORAL
Qty: 20 TABLET | Refills: 0 | Status: SHIPPED | OUTPATIENT
Start: 2023-06-16

## 2023-06-16 RX ORDER — OXYCODONE HYDROCHLORIDE AND ACETAMINOPHEN 5; 325 MG/1; MG/1
1 TABLET ORAL EVERY 6 HOURS PRN
Qty: 28 TABLET | Refills: 0 | Status: SHIPPED | OUTPATIENT
Start: 2023-06-16 | End: 2023-06-23

## 2023-06-16 RX ORDER — ATORVASTATIN CALCIUM 20 MG/1
20 TABLET, FILM COATED ORAL NIGHTLY
Status: DISCONTINUED | OUTPATIENT
Start: 2023-06-17 | End: 2023-06-17 | Stop reason: HOSPADM

## 2023-06-16 RX ORDER — SODIUM CHLORIDE 0.9 % (FLUSH) 0.9 %
5-40 SYRINGE (ML) INJECTION EVERY 12 HOURS SCHEDULED
Status: DISCONTINUED | OUTPATIENT
Start: 2023-06-16 | End: 2023-06-16 | Stop reason: HOSPADM

## 2023-06-16 RX ORDER — ONDANSETRON 2 MG/ML
4 INJECTION INTRAMUSCULAR; INTRAVENOUS
Status: COMPLETED | OUTPATIENT
Start: 2023-06-16 | End: 2023-06-16

## 2023-06-16 RX ORDER — LIDOCAINE HYDROCHLORIDE 10 MG/ML
1 INJECTION, SOLUTION EPIDURAL; INFILTRATION; INTRACAUDAL; PERINEURAL
Status: DISCONTINUED | OUTPATIENT
Start: 2023-06-16 | End: 2023-06-16 | Stop reason: HOSPADM

## 2023-06-16 RX ORDER — ONDANSETRON 2 MG/ML
4 INJECTION INTRAMUSCULAR; INTRAVENOUS EVERY 6 HOURS PRN
Status: DISCONTINUED | OUTPATIENT
Start: 2023-06-16 | End: 2023-06-16 | Stop reason: SDUPTHER

## 2023-06-16 RX ORDER — SODIUM CHLORIDE 9 MG/ML
INJECTION, SOLUTION INTRAVENOUS CONTINUOUS
Status: DISCONTINUED | OUTPATIENT
Start: 2023-06-16 | End: 2023-06-16 | Stop reason: HOSPADM

## 2023-06-16 RX ORDER — FENTANYL CITRATE 0.05 MG/ML
25 INJECTION, SOLUTION INTRAMUSCULAR; INTRAVENOUS EVERY 5 MIN PRN
Status: DISCONTINUED | OUTPATIENT
Start: 2023-06-16 | End: 2023-06-16 | Stop reason: HOSPADM

## 2023-06-16 RX ORDER — METOCLOPRAMIDE HYDROCHLORIDE 5 MG/ML
10 INJECTION INTRAMUSCULAR; INTRAVENOUS
Status: DISCONTINUED | OUTPATIENT
Start: 2023-06-16 | End: 2023-06-16 | Stop reason: HOSPADM

## 2023-06-16 RX ORDER — CEPHALEXIN 500 MG/1
CAPSULE ORAL
Qty: 21 CAPSULE | Refills: 0 | Status: SHIPPED | OUTPATIENT
Start: 2023-06-16

## 2023-06-16 RX ORDER — FENTANYL CITRATE 0.05 MG/ML
50 INJECTION, SOLUTION INTRAMUSCULAR; INTRAVENOUS
Status: DISCONTINUED | OUTPATIENT
Start: 2023-06-16 | End: 2023-06-17 | Stop reason: HOSPADM

## 2023-06-16 RX ADMIN — SODIUM CHLORIDE: 9 INJECTION, SOLUTION INTRAVENOUS at 22:21

## 2023-06-16 RX ADMIN — SODIUM CHLORIDE: 9 INJECTION, SOLUTION INTRAVENOUS at 21:33

## 2023-06-16 RX ADMIN — ONDANSETRON 4 MG: 2 INJECTION INTRAMUSCULAR; INTRAVENOUS at 19:35

## 2023-06-16 RX ADMIN — METOPROLOL TARTRATE 25 MG: 25 TABLET, FILM COATED ORAL at 09:26

## 2023-06-16 RX ADMIN — ALLOPURINOL 200 MG: 100 TABLET ORAL at 09:27

## 2023-06-16 RX ADMIN — METOPROLOL TARTRATE 25 MG: 25 TABLET, FILM COATED ORAL at 22:05

## 2023-06-16 RX ADMIN — Medication 1000 ML: at 22:08

## 2023-06-16 RX ADMIN — SODIUM CHLORIDE, PRESERVATIVE FREE 10 ML: 5 INJECTION INTRAVENOUS at 09:31

## 2023-06-16 RX ADMIN — PIPERACILLIN AND TAZOBACTAM 3375 MG: 3; .375 INJECTION, POWDER, LYOPHILIZED, FOR SOLUTION INTRAVENOUS at 06:19

## 2023-06-16 RX ADMIN — HYDROMORPHONE HYDROCHLORIDE 0.5 MG: 1 INJECTION, SOLUTION INTRAMUSCULAR; INTRAVENOUS; SUBCUTANEOUS at 19:36

## 2023-06-16 RX ADMIN — ATORVASTATIN CALCIUM 20 MG: 20 TABLET, FILM COATED ORAL at 09:26

## 2023-06-16 RX ADMIN — SODIUM CHLORIDE: 9 INJECTION, SOLUTION INTRAVENOUS at 11:38

## 2023-06-16 RX ADMIN — PANTOPRAZOLE SODIUM 40 MG: 40 TABLET, DELAYED RELEASE ORAL at 06:38

## 2023-06-16 RX ADMIN — Medication 2000 MG: at 21:36

## 2023-06-16 RX ADMIN — PIPERACILLIN AND TAZOBACTAM 3375 MG: 3; .375 INJECTION, POWDER, LYOPHILIZED, FOR SOLUTION INTRAVENOUS at 22:24

## 2023-06-16 RX ADMIN — PIPERACILLIN AND TAZOBACTAM 3375 MG: 3; .375 INJECTION, POWDER, LYOPHILIZED, FOR SOLUTION INTRAVENOUS at 14:39

## 2023-06-16 ASSESSMENT — PAIN SCALES - GENERAL
PAINLEVEL_OUTOF10: 7

## 2023-06-16 ASSESSMENT — PAIN - FUNCTIONAL ASSESSMENT: PAIN_FUNCTIONAL_ASSESSMENT: 0-10

## 2023-06-16 ASSESSMENT — ENCOUNTER SYMPTOMS
SHORTNESS OF BREATH: 0
CHEST TIGHTNESS: 0
ABDOMINAL PAIN: 0
DIARRHEA: 0
CONSTIPATION: 0
BACK PAIN: 0

## 2023-06-16 ASSESSMENT — PAIN DESCRIPTION - PAIN TYPE: TYPE: SURGICAL PAIN

## 2023-06-16 ASSESSMENT — PAIN DESCRIPTION - LOCATION: LOCATION: ABDOMEN

## 2023-06-16 ASSESSMENT — PAIN DESCRIPTION - ONSET: ONSET: AWAKENED FROM SLEEP

## 2023-06-16 ASSESSMENT — PAIN DESCRIPTION - DESCRIPTORS: DESCRIPTORS: SHARP

## 2023-06-16 ASSESSMENT — PAIN DESCRIPTION - ORIENTATION: ORIENTATION: MID

## 2023-06-16 NOTE — PLAN OF CARE
Problem: Discharge Planning  Goal: Discharge to home or other facility with appropriate resources  6/16/2023 0434 by Gila Ly RN  Outcome: Progressing     Problem: Pain  Goal: Verbalizes/displays adequate comfort level or baseline comfort level  6/16/2023 0434 by Gila Ly RN  Outcome: Progressing     Problem: Chronic Conditions and Co-morbidities  Goal: Patient's chronic conditions and co-morbidity symptoms are monitored and maintained or improved  6/16/2023 0434 by Gila Ly RN  Outcome: Progressing     Problem: Safety - Adult  Goal: Free from fall injury  6/16/2023 0434 by Gila Ly RN  Outcome: Progressing

## 2023-06-16 NOTE — PLAN OF CARE
Problem: Discharge Planning  Goal: Discharge to home or other facility with appropriate resources  6/16/2023 1113 by Eder Hunter RN  Outcome: Progressing  6/16/2023 0434 by Sly Grayson RN  Outcome: Progressing     Problem: Pain  Goal: Verbalizes/displays adequate comfort level or baseline comfort level  6/16/2023 1113 by Eder Hunter RN  Outcome: Progressing  6/16/2023 0434 by Sly Grayson RN  Outcome: Progressing     Problem: Chronic Conditions and Co-morbidities  Goal: Patient's chronic conditions and co-morbidity symptoms are monitored and maintained or improved  6/16/2023 1113 by Eder Hunter RN  Outcome: Progressing  6/16/2023 0434 by Sly Grayson RN  Outcome: Progressing     Problem: Safety - Adult  Goal: Free from fall injury  6/16/2023 1113 by Eder Hunter RN  Outcome: Progressing  6/16/2023 0434 by Sly Grayson RN  Outcome: Progressing

## 2023-06-16 NOTE — CARE COORDINATION
ONGOING DISCHARGE PLAN:    Patient is alert and oriented x4. Spoke with patient regarding discharge plan and patient confirms that plan is still still to return to home alone. Denies VNS at this time. Pt. Remains on IV Zosyn. Plan is for Cholecystectomy today at [address] [place] [address].    Will follow for any Post Op Needs. Will continue to follow for additional discharge needs. If patient is discharged prior to next notation, then this note serves as note for discharge by case management.     Electronically signed by Vivien Apodaca RN on 6/16/2023 at 9:12 AM

## 2023-06-16 NOTE — OP NOTE
Patient: Min Briones  YOB: 1940  MRN: [id number]    Date of Procedure: 6/16/2023  Preoperative diagnosis: Chronic cholecystitis    Postoperative diagnosis: Same    Procedure: Robotic cholecystectomy    Surgeon: Dr. Jonny Acosta    First asst.: Lance Pacheco CNP    Anesthesia: General    Preparation: Chloraprep    EBL: Less than 50 mL    Specimen: Gallbladder    Procedure: Informed consent was obtained. Preoperative antibiotic was given. Patient was taken to the operating room. General anesthesia was given. Abdomen was prepped and draped in usual sterile fashion. Timeout was done. Supraumbilical incision was made. Lawence Critchley port was introduced using the open technique without any difficulty. CO2 insufflation was carried out. Scope was introduced. Patient was placed in a reverse Trendelenburg position. 3 additional ports were placed in usual fashion. Robot was brought in. All the ports were docked in usual fashion. Camera and the ancillary instruments were advanced towards the target anatomy. Assistant grabbed the fundus of the gallbladder and that was retracted anterosuperiorly. Careful dissection was continued in the triangle of calot. Cystic duct was isolated was skeletonized. Cystic artery was isolated was skeletonized. Critical view was obtained. Anatomy was confirmed. After confirming the anatomy cystic duct was clipped and divided. Cystic artery was clipped and divided. Gallbladder was peeled off the liver bed using the hook cautery. Gallbladder was chronically inflamed. Gallbladder fossa was thoroughly irrigated until returning fluid was clear after it was peeled off. Given the operative findings I decided to proceed with Saeid-Madrigal drain placement in the gallbladder fossa which was accomplished in the usual fashion and was brought out through the 5 mm port site. Satisfactory drain placement was confirmed. Drain was secured. Complete hemostasis was confirmed.   All the

## 2023-06-16 NOTE — DISCHARGE INSTRUCTIONS
Dr. Crook Sep Orders for Outpatient    1.) Diet:    [x]  As tolerated  []  Special     2.) Activity:    [x]  No lifting more than five to ten pounds 2 weeks  [x]  May Shower tomorrow  [x]  No driving until off pain medications     3.) Dressing:    [x]  Remove top dressing in 48 hours   [x]  Leave steri strips on     [x]  Remove and change dressing sooner if soaked    4.) Medication:    [x]  Take medication as prescribed   []  Resume blood thinners in  days    [x]  Resume home medications per AVS Summary    5.) Office visit: Follow up with Dr. Sotero Lopez. Call to schedule an appointment if one has not been made. 6.) Call [phone] if you have any questions or concerns.     Electronically signed by Colleen Cummins MD on 6/16/2023 at 4:33 PM

## 2023-06-17 VITALS
HEART RATE: 58 BPM | SYSTOLIC BLOOD PRESSURE: 145 MMHG | DIASTOLIC BLOOD PRESSURE: 64 MMHG | RESPIRATION RATE: 18 BRPM | HEIGHT: 66 IN | BODY MASS INDEX: 29.73 KG/M2 | OXYGEN SATURATION: 94 % | WEIGHT: 185 LBS | TEMPERATURE: 97.7 F

## 2023-06-17 LAB
GLUCOSE BLD-MCNC: 126 MG/DL (ref 75–110)
GLUCOSE BLD-MCNC: 197 MG/DL (ref 75–110)

## 2023-06-17 PROCEDURE — 99239 HOSP IP/OBS DSCHRG MGMT >30: CPT | Performed by: INTERNAL MEDICINE

## 2023-06-17 PROCEDURE — 82947 ASSAY GLUCOSE BLOOD QUANT: CPT

## 2023-06-17 PROCEDURE — 6370000000 HC RX 637 (ALT 250 FOR IP): Performed by: SURGERY

## 2023-06-17 PROCEDURE — 6360000002 HC RX W HCPCS: Performed by: SURGERY

## 2023-06-17 PROCEDURE — 2580000003 HC RX 258: Performed by: SURGERY

## 2023-06-17 PROCEDURE — 6360000002 HC RX W HCPCS

## 2023-06-17 RX ORDER — ENOXAPARIN SODIUM 100 MG/ML
40 INJECTION SUBCUTANEOUS DAILY
Status: DISCONTINUED | OUTPATIENT
Start: 2023-06-17 | End: 2023-06-17 | Stop reason: HOSPADM

## 2023-06-17 RX ADMIN — POLYETHYLENE GLYCOL 3350 17 G: 17 POWDER, FOR SOLUTION ORAL at 10:16

## 2023-06-17 RX ADMIN — PIPERACILLIN AND TAZOBACTAM 3375 MG: 3; .375 INJECTION, POWDER, LYOPHILIZED, FOR SOLUTION INTRAVENOUS at 05:16

## 2023-06-17 RX ADMIN — PIPERACILLIN AND TAZOBACTAM 3375 MG: 3; .375 INJECTION, POWDER, LYOPHILIZED, FOR SOLUTION INTRAVENOUS at 12:57

## 2023-06-17 RX ADMIN — Medication 2000 MG: at 12:16

## 2023-06-17 RX ADMIN — METOPROLOL TARTRATE 25 MG: 25 TABLET, FILM COATED ORAL at 08:59

## 2023-06-17 RX ADMIN — Medication 2000 MG: at 04:39

## 2023-06-17 RX ADMIN — PANTOPRAZOLE SODIUM 40 MG: 40 TABLET, DELAYED RELEASE ORAL at 06:26

## 2023-06-17 RX ADMIN — ALLOPURINOL 200 MG: 100 TABLET ORAL at 08:59

## 2023-06-17 RX ADMIN — HYDRALAZINE HYDROCHLORIDE 25 MG: 25 TABLET, FILM COATED ORAL at 08:59

## 2023-06-17 RX ADMIN — ENOXAPARIN SODIUM 40 MG: 100 INJECTION SUBCUTANEOUS at 08:59

## 2023-06-17 RX ADMIN — ASPIRIN 81 MG: 81 TABLET, CHEWABLE ORAL at 08:59

## 2023-06-17 NOTE — PLAN OF CARE
Problem: Pain  Goal: Verbalizes/displays adequate comfort level or baseline comfort level  6/17/2023 1516 by Tu Abarca RN  Outcome: Adequate for Discharge, Pt educated on non-pharmacological pain interventions. PRN pain medications administered. Problem: Safety - Adult  Goal: Free from fall injury  6/17/2023 1516 by Tu Abarca RN  Outcome: Adequate for Discharge, Patient remains free of incidence/ injury. Bed remains in low position. Side rails up x2.

## 2023-06-17 NOTE — PROGRESS NOTES
[address] [facility].    PROGRESS NOTE             6/16/2023    9:50 AM    Name:   Dana Alejandra  MRN:     [id number]     Acct:      [de-identified]   Room:   2068/2068-01  IP Day:  3  Admit Date:  6/13/2023  8:26 AM    PCP:  Clem Israel MD  Code Status:  Full Code    Subjective:     C/C:   Chief Complaint   Patient presents with    Flank Pain     Right side pain off and on for a few days with emesis, has Hx of gallstones. Interval History Status: improved. Patient was seen and examined at bedside. Patient has no complaints overnight, no significant events. Plan for cholecystectomy per surgery later afternoon today. Brief History:     Patient is an 89year-old male patient, with past medical history significant for diabetes, hypertension, gout, CAD status post CABG, presented with abdominal pain, with chronic cholecystitis requiring ERCP and plan for cholecystectomy today per surgery. Review of Systems:     Review of Systems   Constitutional:  Negative for chills, fatigue and fever. Respiratory:  Negative for chest tightness and shortness of breath. Cardiovascular:  Negative for chest pain and leg swelling. Gastrointestinal:  Negative for abdominal pain, constipation and diarrhea. Endocrine: Negative for polydipsia and polyuria. Genitourinary:  Negative for dysuria. Musculoskeletal:  Negative for back pain. Skin:  Negative for rash. Neurological:  Negative for facial asymmetry and speech difficulty. Psychiatric/Behavioral:  Negative for behavioral problems. Medications: Allergies:     Allergies   Allergen Reactions    Lisinopril Swelling    Lisinopril Swelling    Amlodipine        Current Meds:   Scheduled Meds:    allopurinol  200 mg Oral Daily    [Held by provider] aspirin  81 mg Oral Daily with breakfast    atorvastatin  20 mg Oral Daily    [Held by provider] hydrALAZINE  25 mg Oral BID
[address]    PROGRESS NOTE             6/17/2023    7:50 AM    Name:   Kvng Patrick  MRN:     [id number]     Acct:      [de-identified]   Room:   2068/2068-01  IP Day:  4  Admit Date:  6/13/2023  8:26 AM    PCP:  Kat Blandon MD  Code Status:  Full Code    Subjective:     C/C:   Chief Complaint   Patient presents with    Flank Pain     Right side pain off and on for a few days with emesis, has Hx of gallstones. Interval History Status: improved. BP slightly elevated- otherwise vital signs stable. Resumed his home meds post surgery. Will continue to monitor  Pt resting comfortably  S/p cholecystectomy- has not passed gas or had a BM yet. Hx of constipation. Will ask nurse to give glycolax  Anticipate d/c today    Brief History:     Patient is an 89year-old male patient, with past medical history significant for diabetes, hypertension, gout, CAD status post CABG, presented with abdominal pain, with chronic cholecystitis requiring ERCP and cholecystectomy. Pt doing much better. Anticipate d/c today. Review of Systems:     Review of Systems  Constitutional:  Negative for chills, fatigue and fever. Respiratory:  Negative for chest tightness and shortness of breath. Cardiovascular:  Negative for chest pain and leg swelling. Gastrointestinal:  Negative for abdominal pain, constipation and diarrhea. Endocrine: Negative for polydipsia and polyuria. Genitourinary:  Negative for dysuria. Musculoskeletal:  Negative for back pain. Skin:  Negative for rash. Neurological:  Negative for facial asymmetry and speech difficulty. Psychiatric/Behavioral:  Negative for behavioral problems. Medications: Allergies:     Allergies   Allergen Reactions    Lisinopril Swelling    Amlodipine        Current Meds:   Scheduled Meds:    enoxaparin  40 mg SubCUTAneous Daily    atorvastatin  20 mg Oral Nightly    ceFAZolin  2,000 mg
[address] [facility]    PROGRESS NOTE             6/14/2023    8:22 AM    Name:   Nettie Voss  MRN:     [id number]     Acct:      [de-identified]   Room:   2068/2068-01  IP Day:  1  Admit Date:  6/13/2023  8:26 AM    PCP:  Yennifer Ferreira MD  Code Status:  Full Code    Subjective:     C/C:   Chief Complaint   Patient presents with    Flank Pain     Right side pain off and on for a few days with emesis, has Hx of gallstones. Interval History Status: not changed. Did MRCP yesterday  Anticipate ERCP today  NPO  Vital signs stable     Brief History:     The patient is a 81 y.o. Non- / non  male who presents withFlank Pain (Right side pain off and on for a few days with emesis, has Hx of gallstones.  )   and he is admitted to the hospital for the management of cholelithiasis in the setting of chronic cholecystitis. Patient's daughter was in the room. Was able to provide majority of the history. Patient is an 71-year-old male who was complaining of right upper quadrant pain that started a few days ago. This has been on and off for many years. When this does occur, patient complains of shaking and vomiting which has always gone away. However, daughter feels that he does need to take more action about this issue. Everytime this goes away, he does not want to get it addressed. The CT in the ED shows Cholelithiasis with likely mild chronic cholecystitis; mild dilatation biliary tree with a possible 5 mm stone distal common bile duct. Consider ERCP or MRCP, depending upon symptoms and LFTs. Lactic acid, lipase & FTs all normal.  WBC 6.9. Patient is not ill-appearing. We will consult both general surgery and GI. Patient does follow Dr. Pinky Antoine in the outpatient setting.      Patient does have a past medical history of triple bypass and an aortic valve replacement in 2017, right knee replacement, diabetes, hypertension and
Accu check 87
Cardiology consult called to [facility], spoke with [facility]. She will let on call physician know.
Dr. Shayy Medina, covering for Dr. Dodie Randolph sent secured message about consult.  Awaiting response
First Assist Operative Note      Patient: Dior Etienne  YOB: 1940  MRN: [id number]    Date of Procedure: 6/16/2023    Pre-Op Diagnosis Codes:  Chronic cholecystitis    Post-Op Diagnosis: Same       Procedure(s):  CHOLECYSTECTOMY LAPAROSCOPIC ROBOTIC XI    Surgeon(s):  Bryan Feliz MD    Assistant: GALEN Sewell CNP was needed throughout the entirety of case for positioning, exposure, retraction and handling of the instruments and assistance in the procedure. Anesthesia: General    Detailed Description of Procedure: See Dr. Jeimy Silva operative note from 6/16/2023 for further detail.     Electronically signed by GALEN Sewell CNP on 6/16/2023 at 7:24 PM
GI Progress notes    6/16/2023   10:59 AM    Name:  Summer Malone  MRN:    [id number]     Acct:     [de-identified]   Room:  2068/2068-01   Day: 3     Admit Date: 6/13/2023  8:26 AM  PCP: Natasha Crawford MD    Subjective:     C/C:   Chief Complaint   Patient presents with    Flank Pain     Right side pain off and on for a few days with emesis, has Hx of gallstones. Interval History: Status: improved. Patient seen and examined. No acute events overnight  S/p ERCP with cholangiogram, sphincterotomy, stone extraction. Tolerated well. No abdominal pain, nausea, vomiting following ERCP  Plans for lap chester today with Dr. Cari Smith    ROS:  Constitutional: negative for chills, fevers and sweats  Respiratory: negative for cough, dyspnea on exertion, hemoptysis, shortness of breath and wheezing  Cardiovascular: negative for chest pain, chest pressure/discomfort, dyspnea, lower extremity edema and palpitations  Gastrointestinal: negative for abdominal pain, constipation, diarrhea, nausea and vomiting  Neurological: negative for dizziness and headaches    Medications: Allergies:    Allergies   Allergen Reactions    Lisinopril Swelling    Lisinopril Swelling    Amlodipine        Current Meds: [START ON 6/17/2023] atorvastatin (LIPITOR) tablet 20 mg, Nightly  hydrALAZINE (APRESOLINE) injection 10 mg, Q6H PRN  allopurinol (ZYLOPRIM) tablet 200 mg, Daily  [Held by provider] aspirin chewable tablet 81 mg, Daily with breakfast  [Held by provider] hydrALAZINE (APRESOLINE) tablet 25 mg, BID  metoprolol tartrate (LOPRESSOR) tablet 25 mg, BID  pantoprazole (PROTONIX) tablet 40 mg, QAM AC  sodium chloride flush 0.9 % injection 5-40 mL, 2 times per day  sodium chloride flush 0.9 % injection 5-40 mL, PRN  0.9 % sodium chloride infusion, PRN  ondansetron (ZOFRAN-ODT) disintegrating tablet 4 mg, Q8H PRN   Or  ondansetron (ZOFRAN) injection 4 mg, Q6H PRN  polyethylene glycol (GLYCOLAX) packet 17 g, Daily PRN  acetaminophen
General Surgery Progress Note    IP Day: 4  POD#1 s/p robotic cholecystectomy      Subjective:   24 Hour Events:  Doing well. Tolerating regular diet. Pain controlled. No Nausea or bloating. Objective: Allergies   Allergen Reactions    Lisinopril Swelling    Amlodipine        Intake/Output last 3 shifts:  I/O last 3 completed shifts: In: 1300 [I.V.:1300]  Out: 510 [Urine:475; Drains:30; Blood:5]    Vitals:    06/17/23 0404   BP: (!) 145/64   Pulse: 58   Resp: 18   Temp: 97.7 °F (36.5 °C)   SpO2: 94%     BP (!) 145/64   Pulse 58   Temp 97.7 °F (36.5 °C)   Resp 18   Ht 5' 6\" (1.676 m)   Wt 185 lb (83.9 kg)   SpO2 94%   BMI 29.86 kg/m²               Physical Exam  General Appearance: Awake, No Acute Distress  Pulmonary: Unlabored breathing. Lungs are clear to auscultation. No expiratory wheezes. Cardiac: Regular rate and rhythm, no murmurs. Abdomen: Soft, non-distended. Appropriate postop julita-incisional tenderness. Incisions: Clean, dry, and intact.   Lines, Drains, Tubes: SHAMAR serosanguinous        Laboratory Data:          CBC:  Lab Results   Component Value Date/Time    WBC 6.0 06/16/2023 11:39 AM    WBC 4.8 06/14/2023 06:23 AM    WBC 6.9 06/13/2023 09:08 AM    HGB 12.0 06/16/2023 11:39 AM    HGB 11.7 06/14/2023 06:23 AM    HGB 12.4 06/13/2023 09:08 AM    HCT 39.6 06/16/2023 11:39 AM    HCT 37.5 06/14/2023 06:23 AM    HCT 38.8 06/13/2023 09:08 AM    MCV 93.5 06/16/2023 11:39 AM     06/16/2023 11:39 AM     06/14/2023 06:23 AM     06/13/2023 09:08 AM        BMP:    Lab Results   Component Value Date/Time     06/16/2023 12:38 PM    K 4.2 06/16/2023 12:38 PM     06/16/2023 12:38 PM    CO2 24 06/16/2023 12:38 PM    BUN 8 06/16/2023 12:38 PM    CREATININE 0.98 06/16/2023 12:38 PM    GLUCOSE 104 06/16/2023 12:38 PM      LIVER PROFILE:   Lab Results   Component Value Date/Time    ALT 14 06/16/2023 12:38 PM    AST 19 06/16/2023 12:38 PM    PROT 6.4 06/16/2023 12:38 PM
Notified Dr. Chanel English NP of new consult.
Patient returned from OR. Patient oriented to room, side rails up x2, call light within reach. Assessment to follow.
Patient to OR for ERCP via stretcher, on RA, IVF infusing as ordered without difficulty. Accompanied by daughter and hospital transporter.
Patient was downstairs getting ERCP. Results noted. Patient was found to have multiple pigmented stones in the common duct. Those were removed by GI. Final cholangiogram did not reveal any filling defects. Cystic duct was patent. Diet per GI. Patient has been scheduled for elective cholecystectomy next week Tuesday tentatively. Discussed with charge nurse. Discussed with patient's daughter. If the schedule allows I may do his cholecystectomy tomorrow otherwise next week Tuesday. We will update the patient and the family tomorrow. N.p.o. after midnight for now. Hold blood thinners for now. Diet per GI for this evening and then n.p.o. after midnight.
Patient was seen and examined. Cleared by cardiology to proceed with ERCP. GI input noted. Afebrile vital signs are stable. Abdomen is benign. Extremity nontender. Blood work reviewed. MRCP noted. For ERCP tomorrow. Patient likely will need cholecystectomy at some point. Discussed with nurse taking care of the patient. I will follow with you.
Pharmacy Medication History Note      List of current medications patient is taking is complete. Source of information: patient, [facility] dispense history, OARRS    Changes made to medication list:  Medications flagged for removal (include reason, ex. noncompliance): Warfarin - patient reports he is no longer on this medication  Polyethylene glycol - patient reports no longer using this medication  Famotidine - patient reports no longer taking this medication. Patient is on omeprazole. Baclofen - patient reports no longer taking this medication  Amiodarone - patient reports no longer taking this medication    Medications removed (include reason, ex. therapy complete or physician discontinued):  None     Medications added/doses adjusted:  Acetaminophen 500 mg added  Allopurinol added  Fluticasone nasal spray changed to daily as needed  The patient reports only using furosemide as needed  Hydralazine 25 mg twice daily added  Fish oil added  Omeprazole 20 mg daily added    Other notes (ex. Recent course of antibiotics, Coumadin dosing):  Patient's prescription for hydralazine is three times daily, however the patient is only taking it twice daily. Denies use of other OTC or herbal medications.       Allergies clarified    Medication list provided to the patient: no   Medication education provided to the patient: none      Electronically signed by Stella Whalen [facility] on 6/13/2023 at 11:42 AM
Pt INT removed. Pt tolerated well, no complications. Follow up appts. , discharge instructions and medications reviewed with patient and daughter. Understanding stated, denies questions or concerns. Pt transferred home via private vehicle.
Received callback from Dr. Perlita Milian and he will see patient today.
Writer agrees with all Chidi Keene RN charting.
Writer attempted to call Dr. Chirag Alvarado but no answer. Writer will re-attempt as time allows. Received call back from Dr. Chirag Alvarado and instructed that patient can have clear liquid if he wants to. Order placed.
ribs, Type 2 diabetes mellitus ([facility] [address] and Ventral hernia, unspecified, without mention of obstruction or gangrene. Past Surgical History:   has a past surgical history that includes hernia repair; Knee arthroscopy; Cardiac valve replacement (01/05/2017); Cardiac surgery (01/05/2017); Vein Surgery (Left); Knee arthroscopy (Bilateral); Prostate surgery; and hernia repair (Bilateral). Restrictions  Restrictions/Precautions  Restrictions/Precautions: General Precautions; Up as Tolerated  Required Braces or Orthoses?: No  Implants present? : Metal implants (Stents in heart, R TKA)  Position Activity Restriction  Other position/activity restrictions: OT/PT eval and treat, up as tolerated      Vitals  Vitals  Pulse: 55  Heart Rate Source: Radial  BP: (!) 140/75  MAP (Calculated): 97  Respirations: 18  SpO2: 95 %     Subjective  Subjective: \"I am ready to get back to my gardens\"  Comments: Okay for OT/PT per RN Ernesto Sarah  Subjective  Pain: pt denies pain at present    Social/Functional History  Social/Functional History  Lives With: Alone  Type of Home: House  Home Layout: One level  Home Access: Stairs to enter without rails  Entrance Stairs - Number of Steps: 2 RACHEL  Bathroom Shower/Tub: Walk-in shower, Shower chair without back, Doors  Bathroom Toilet: Standard  Bathroom Equipment: Grab bars in shower, Shower chair, Grab bars around toilet  Bathroom Accessibility: Walker accessible  Home Equipment: Walker, rolling, Cane  Has the patient had two or more falls in the past year or any fall with injury in the past year?: No (1 reported fall while walking backwards with the vacuum )  Receives Help From: Family (Daughter Florence Ewing and Brother available)  ADL Assistance: Independent  Homemaking Assistance: Independent  Homemaking Responsibilities: Yes  Ambulation Assistance: Independent  Transfer Assistance: Independent  Active : Yes  Mode of Transportation: SUV  Occupation: Retired  Leisure & Hobbies: Gardening  IADL
OutComes Score    AM-PAC Score  AM-PAC Inpatient Mobility Raw Score : 24 (06/14/23 0952)  AM-PAC Inpatient T-Scale Score : 61.14 (06/14/23 0952)  Mobility Inpatient CMS 0-100% Score: 0 (06/14/23 0952)  Mobility Inpatient CMS G-Code Modifier : [facility] (06/14/23 0099)      Goals  Short Term Goals  Time Frame for Short Term Goals: No acute PT goals identified       Therapy Time   Individual Concurrent Group Co-treatment   Time In 0952         Time Out 1014         Minutes 22         Timed Code Treatment Minutes: 0 Minutes       Irina Giordano, PT
within nondilated mid abdominal small bowel loops. Mild mural thickening gastric antrum without perigastric soft tissue stranding. Pelvis: Unremarkable urinary bladder. Small fat containing left inguinal hernia; post right herniorrhaphy. Surgically absent prostate. Peritoneum/Retroperitoneum: Moderate calcific ASVD and elongation abdominal aorta and iliac arteries, without aneurysm, significant stenosis or occlusion. Calcified mesenteric node; no bulky lymphadenopathy. Bones/Soft Tissues: No acute bony or soft tissue abnormality moderate DJD and some DDD visualized thoracolumbar spine. Large amount of retained stool throughout large bowel. No bowel obstruction. Diverticulosis without CT evidence diverticulitis. Cholelithiasis with likely mild chronic cholecystitis; mild dilatation biliary tree with a possible 5 mm stone distal common bile duct. Consider ERCP or MRCP, depending upon symptoms and LFTs. 1.1 cm mass medial limb left adrenal gland and slight nodularity lateral limb right adrenal gland, as above. See recommendations below. Likely renal cysts, larger on the left. Mild interstitial lung disease visualized bases. Correlate with LFTs/history. Small hiatus hernia. Additional likely incidental findings, as above. RECOMMENDATIONS: Recommend 1 year follow-up adrenal washout CT. If stable for greater than 1 year, no further follow-up imaging required. JACR 2017 Aug; 14(8):1038-44, JCAT 2016 Teresa Combs; 40(2):194-200, Urol J 2006 Spring; 3(2):71-4.      MRI ABDOMEN W WO CONTRAST MRCP    Result Date: 6/13/2023  EXAMINATION: MRI OF THE ABDOMEN WITH AND WITHOUT CONTRAST AND MRCP 6/13/2023 5:36 pm TECHNIQUE: Multiplanar multisequence MRI of the abdomen was performed with and without the administration of intravenous contrast.  After initial T2 axial and coronal images, thick slab, thin slab and 3D coronal MRCP sequences were obtained without the administration of intravenous contrast.  MIP images are provided

## 2023-06-17 NOTE — DISCHARGE SUMMARY
[address]    Discharge Summary     Patient ID: Radha Richter  :  1940   MRN: [id number]     ACCOUNT:  [de-identified]   Patient's PCP: Reyes Oro MD  Admit Date: 2023   Discharge Date: 2023     Length of Stay: 4  Code Status:  Full Code  Admitting Physician: Jaclyn Gold MD  Discharge Physician: Amber Hinkle MD     Active Discharge Diagnoses:       Primary Problem  Chronic cholecystitis      Hospital Problems  Active Hospital Problems    Diagnosis Date Noted    Calculus of gallbladder and bile duct [K80.70] 10/24/2022     Priority: Medium    Chronic cholecystitis [K81.1] 2023    Cholecystitis [K81.9] 2023    Cancer ([facility] [address] [C80.1] 2023    S/P CABG x 3 [Z95.1] 2017    H/O aortic valve replacement [Z95.2] 2017    Type 2 diabetes mellitus without complication, without long-term current use of insulin ([facility] [address] [E11.9] 10/25/2016    Mixed hyperlipidemia [E78.2] 2016    Allergy to ACE inhibitors [Z88.8] 2016    Osteoarthritis of knee, unilateral [M17.10] 2015    Type 2 diabetes mellitus without complication ([facility] [address] [I15.3] 07/10/2014    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 male who was admitted for the management of   Chronic cholecystitis , presented to ER with Flank Pain (Right side pain off and on for a few days with emesis, has Hx of gallstones.  )    Patient is an 81-year-old pleasant male who came in complaining of right upper quadrant pain. This has been going on and off for him for the last couple years and when it does occur he complains of chills and vomiting that has always gone away and never followed up on it.   The CT in the ED shows cholelithiasis with mild chronic cholecystitis, mild

## 2023-06-20 LAB — SURGICAL PATHOLOGY REPORT: NORMAL

## 2023-10-11 ENCOUNTER — OFFICE VISIT (OUTPATIENT)
Dept: ORTHOPEDIC SURGERY | Age: 83
End: 2023-10-11

## 2023-10-11 DIAGNOSIS — Z96.651 HISTORY OF TOTAL RIGHT KNEE REPLACEMENT: Primary | ICD-10-CM

## 2023-10-11 DIAGNOSIS — M25.559 HIP PAIN, UNSPECIFIED LATERALITY: ICD-10-CM

## 2023-10-11 NOTE — PROGRESS NOTES
Venus Gonzalez M.D.            [address] [facility], [address] [facility], [address] [place] [address] [address]           Dept Phone: [phone]           Dept Fax:  [address] [facility], [address] [facility]          Dept Phone: [phone]           Dept Fax:  [phone]      Chief Compliant:  Chief Complaint   Patient presents with    Pain     H/O Rt TKA 1/27/22 , hip pain          History of Present Illness: This is a 81 y.o. male who presents to the clinic today for evaluation / follow up of status post right total knee and then also pinning complaining of \"left hip \"pain patient states that he does not have much pain in his right knee nor his left knee. He does admit that his right knee does not move as far back as he like but he does not really hamper him at all. Patient's biggest complaint is that he states when he gets out of bed he has tightness across his lower back and going across his side on the left. He denies any groin pain he denies any radicular symptoms. He states that once he is up and going after 20 minutes or half hour the symptoms are much improved. .       Review of Systems   Constitutional: Negative for fever, chills, sweats. Eyes: Negative for changes in vision, or pain. HENT: Negative for ear ache, epistaxis, or sore throat. Respiratory/Cardio: Negative for Chest pain, palpitations, SOB, or cough. Gastrointestinal: Negative for abdominal pain, N/V/D. Genitourinary: Negative for dysuria, frequency, urgency, or hematuria. Neurological: Negative for headache, numbness, or weakness. Integumentary: Negative for rash, itching, laceration, or abrasion. Musculoskeletal: Positive for Pain (H/O Rt TKA 1/27/22 , hip pain/)       Physical Exam:  Constitutional: Patient is oriented to person, place, and time. Patient appears well-developed and well nourished.

## 2023-10-31 ENCOUNTER — OFFICE VISIT (OUTPATIENT)
Dept: ORTHOPEDIC SURGERY | Age: 83
End: 2023-10-31
Payer: COMMERCIAL

## 2023-10-31 VITALS — BODY MASS INDEX: 28.77 KG/M2 | WEIGHT: 179.01 LBS | RESPIRATION RATE: 16 BRPM | HEIGHT: 66 IN

## 2023-10-31 DIAGNOSIS — M54.50 CHRONIC MIDLINE LOW BACK PAIN WITHOUT SCIATICA: Primary | ICD-10-CM

## 2023-10-31 DIAGNOSIS — G89.29 CHRONIC MIDLINE LOW BACK PAIN WITHOUT SCIATICA: Primary | ICD-10-CM

## 2023-10-31 PROCEDURE — 1124F ACP DISCUSS-NO DSCNMKR DOCD: CPT | Performed by: ORTHOPAEDIC SURGERY

## 2023-10-31 PROCEDURE — 99213 OFFICE O/P EST LOW 20 MIN: CPT | Performed by: ORTHOPAEDIC SURGERY

## 2023-10-31 NOTE — PROGRESS NOTES
Patient ID: Christy Castro is a 81 y.o. male    Chief Compliant:  Chief Complaint   Patient presents with    Lower Back Pain        Diagnostic imaging:    CT scan abdomen pelvis for spine reviewed patient with very capacious spinal canal moderate facet arthritis and degenerative disc disease    Hips on x-rays look normal    Knee on x-rays status post right total knee arthroplasty end-stage degenerative arthritis left knee    Assessment and Plan:  1. Low back pain, unspecified back pain laterality, unspecified chronicity, unspecified whether sciatica present      Low back pain in the left buttock worse first thing in the morning and after he has been laying down for an extended period of time    Pain management for SHEY's    Follow up 3 months    HPI:  This is a 81 y.o. male who presents to the clinic today as a new patient for low back evaluation. Patient complains of low back pain and stiffness in the morning on his left side when getting out of bed and when sitting for long periods of time. His pain and stiffness improves throughout the morning and denies any radicular leg pain. Review of Systems   All other systems reviewed and are negative.       Past History:    Current Outpatient Medications:     metoprolol tartrate (LOPRESSOR) 25 MG tablet, Take 0.5 tablets by mouth 2 times daily, Disp: 90 tablet, Rfl: 3    cephALEXin (KEFLEX) 500 MG capsule, 500 mgTake three times daily (Patient not taking: Reported on 8/17/2023), Disp: 21 capsule, Rfl: 0    ondansetron (ZOFRAN) 4 MG tablet, Take every six hours as needed (Patient not taking: Reported on 8/17/2023), Disp: 20 tablet, Rfl: 0    metFORMIN (GLUCOPHAGE) 500 MG tablet, Take 2 tablets by mouth 2 times daily (with meals), Disp: , Rfl:     omeprazole (PRILOSEC) 20 MG delayed release capsule, Take 1 capsule by mouth daily, Disp: , Rfl:     hydrALAZINE (APRESOLINE) 25 MG tablet, Take 1 tablet by mouth in the morning and at bedtime, Disp: , Rfl:     allopurinol

## 2023-11-20 ENCOUNTER — HOSPITAL ENCOUNTER (OUTPATIENT)
Dept: PAIN MANAGEMENT | Age: 83
Discharge: HOME | End: 2023-11-20
Payer: COMMERCIAL

## 2023-11-20 VITALS — BODY MASS INDEX: 28.77 KG/M2 | TEMPERATURE: 97.3 F | HEIGHT: 66 IN | WEIGHT: 179 LBS

## 2023-11-20 DIAGNOSIS — M54.16 LUMBAR RADICULOPATHY: Primary | ICD-10-CM

## 2023-11-20 DIAGNOSIS — M51.36 LUMBAR DEGENERATIVE DISC DISEASE: ICD-10-CM

## 2023-11-20 DIAGNOSIS — M47.817 LUMBOSACRAL SPONDYLOSIS WITHOUT MYELOPATHY: ICD-10-CM

## 2023-11-20 DIAGNOSIS — M79.18 MYOFASCIAL PAIN SYNDROME: ICD-10-CM

## 2023-11-20 PROBLEM — M51.369 LUMBAR DEGENERATIVE DISC DISEASE: Status: ACTIVE | Noted: 2023-11-20

## 2023-11-20 PROCEDURE — 99204 OFFICE O/P NEW MOD 45 MIN: CPT | Performed by: STUDENT IN AN ORGANIZED HEALTH CARE EDUCATION/TRAINING PROGRAM

## 2023-11-20 PROCEDURE — 99203 OFFICE O/P NEW LOW 30 MIN: CPT

## 2023-11-20 ASSESSMENT — PAIN SCALES - GENERAL: PAINLEVEL_OUTOF10: 0

## 2023-11-20 NOTE — PROGRESS NOTES
Chronic Pain Clinic Note     Encounter Date: 11/20/2023     SUBJECTIVE:  Chief Complaint   Patient presents with    New Patient     Back pain       History of Present Illness:   Norma Sherman is a 81 y.o. male who presents with back pain    Medication Refill: n/a    Current Complaints of Pain:   Location: back   Radiation: Left hip  Severity: moderate  Pain Numerical Score - 0 today   Average:  4     Highest:  5  Lowest:  0  Character/Quality: Complains of pain that is aching  Timing: Intermittent  Associated symptoms: none  Numbness: yes, hands & feet  Weakness: no  Exacerbating factors:  laying down its hard to turn over, sitting  Alleviating factors:  nothing  Length of time pain has been present: Started about 2-3 months ago  Inciting event/injury:  no  Bowel/Bladder incontinence: no  Falls:  no  Physical Therapy: no    History of Interventions:   Surgery: No previous lumbar/cervical surgeries  Injections: None    Imaging:    Xray Lumbar 10/31/2023    Past Medical History:   Diagnosis Date    Diabetes ([address])     Edema     Essential hypertension, benign     Gout     Heart burn     Herpes zoster without mention of complication     Hypertension     Neoplasm of uncertain behavior of prostate     Osteoarthritis     Pain in joint, lower leg     Psychogenic pain, site unspecified     Sprain of ribs     Type 2 diabetes mellitus (HCC)     Ventral hernia, unspecified, without mention of obstruction or gangrene        Past Surgical History:   Procedure Laterality Date    CARDIAC SURGERY  01/05/2017    Triple bypass    CARDIAC VALVE REPLACEMENT  01/05/2017    CHOLECYSTECTOMY, LAPAROSCOPIC N/A 6/16/2023    CHOLECYSTECTOMY LAPAROSCOPIC ROBOTIC XI performed by Jonelle Vilchis [place] at [address] [place]    ERCP N/A 06/15/2023    ERCP SPHINCTER/PAPILLOTOMY performed by Jessy Rubio MD at [facility] Bilateral     KNEE ARTHROSCOPY      KNEE ARTHROSCOPY Bilateral     PROSTATE SURGERY      TOTAL KNEE

## 2024-01-23 ENCOUNTER — TELEPHONE (OUTPATIENT)
Dept: GASTROENTEROLOGY | Age: 84
End: 2024-01-23

## 2024-02-05 ENCOUNTER — OFFICE VISIT (OUTPATIENT)
Dept: ORTHOPEDIC SURGERY | Age: 84
End: 2024-02-05
Payer: COMMERCIAL

## 2024-02-05 VITALS — HEIGHT: 66 IN | RESPIRATION RATE: 16 BRPM | BODY MASS INDEX: 28.77 KG/M2 | WEIGHT: 179.01 LBS

## 2024-02-05 DIAGNOSIS — M25.511 RIGHT SHOULDER PAIN, UNSPECIFIED CHRONICITY: Primary | ICD-10-CM

## 2024-02-05 PROCEDURE — 1124F ACP DISCUSS-NO DSCNMKR DOCD: CPT | Performed by: ORTHOPAEDIC SURGERY

## 2024-02-05 PROCEDURE — 99203 OFFICE O/P NEW LOW 30 MIN: CPT | Performed by: ORTHOPAEDIC SURGERY

## 2024-02-05 NOTE — PROGRESS NOTES
Orthopedic Shoulder Encounter Note     Chief complaint: right shoulder pain    HPI: Gerard Etienne is a 83 y.o.  right-hand dominant male who presents for evaluation of his right shoulder.  Indicates that he has been dealing with right shoulder pain for about 6 months now.  No precipitating trauma or injury.  He does report having a fall sometime last year but is not clear as to whether or not this is related to the pain that he is experiencing.  In any event he is experiencing pain over the anterior shoulder and deep.  He has pain with activities such as pulling up his pants or trying to reach behind to put his coat on.  His pain is fairly tolerable and states that he can live with it but wanted to find out what was causing the pain and if it would get worse.  He denies having any weakness but does report having some stiffness.    Previous treatment:    NSAIDs: None    Physical Therapy:  No    Injections: Right shoulder cortisone injection administered in December 2023 by his PCP Dr. Cornell without any significant improvement    Surgeries: None    Review of Systems:   Constitutional: Negative for fever, chills, sweats.   Pain Score:   6  Neurological: Negative for headache, numbness, or weakness.   Musculoskeletal: As noted in HPI     Past Medical History  Gerard  has a past medical history of Diabetes (HCC), Edema, Essential hypertension, benign, Gout, Heart burn, Herpes zoster without mention of complication, Hypertension, Neoplasm of uncertain behavior of prostate, Osteoarthritis, Pain in joint, lower leg, Psychogenic pain, site unspecified, Sprain of ribs, Type 2 diabetes mellitus (HCC), and Ventral hernia, unspecified, without mention of obstruction or gangrene.    Past Surgical History  Gerard  has a past surgical history that includes hernia repair; Knee arthroscopy; Cardiac valve replacement (01/05/2017); Cardiac surgery (01/05/2017); Vein Surgery (Left); Knee arthroscopy (Bilateral); Prostate surgery; hernia

## 2024-02-13 ENCOUNTER — TELEPHONE (OUTPATIENT)
Dept: ORTHOPEDIC SURGERY | Age: 84
End: 2024-02-13

## 2024-02-13 NOTE — TELEPHONE ENCOUNTER
Nohemi, patient's daughter who is listed on [employer] as of 2018, called office with questions regarding a possible estimate/bill from his appt with Dr. Daugherty on 2/5/24. Daughter was unable to verify if it was an actual bill or not. Daughter was concerned that patient's insurance was not properly billed. I spoke with  staff member at [facility] ([facility] who looked into patient's chart and verified that his [employer] insurance was verified and utilized in his appt with Dr. Daugherty. Daughter provider with  billing phone number so that she could speak with individuals specialized in billing questions/concerns.

## 2024-03-19 ENCOUNTER — TELEPHONE (OUTPATIENT)
Dept: ORTHOPEDIC SURGERY | Age: 84
End: 2024-03-19

## 2024-03-19 DIAGNOSIS — M25.511 RIGHT SHOULDER PAIN, UNSPECIFIED CHRONICITY: Primary | ICD-10-CM

## 2024-03-19 NOTE — TELEPHONE ENCOUNTER
LVM with daughter informing her that the order for US guided injection has been ordered. Number for central scheduling was included in the message as well

## 2024-03-19 NOTE — TELEPHONE ENCOUNTER
Dr. Willow Cardenas dictated in 2/5/24 OV that he would order an US guided injection if patient changed his mind and called office. Ok to order injection?   No

## 2024-03-19 NOTE — TELEPHONE ENCOUNTER
Patients daughter, Nohemi called [place] office.   Confirmed patients daughter is on [employer].     Patient is requesting an order for an US guided cortisone shot that was offered at last OV.     Please call patients daughter once order is placed.

## 2024-03-21 ENCOUNTER — TELEPHONE (OUTPATIENT)
Dept: INTERVENTIONAL RADIOLOGY/VASCULAR | Age: 84
End: 2024-03-21

## 2024-03-21 NOTE — TELEPHONE ENCOUNTER
Pt's daughter Nohemi calls in and reports that pt would like to have US guided biceps tendon injection performed at [place]. Nohemi informed that referral will be printed and placed at the  of [facility]. Nohemi informed that pt is responsible for contacting [facility] for scheduling. [facility] hours provided. Nohemi voices understanding.

## 2024-03-21 NOTE — TELEPHONE ENCOUNTER
Attempted to schedule pt for injection with daughter. Daughter stated that she would like to have this completed at [facility].

## 2024-10-07 ENCOUNTER — OFFICE VISIT (OUTPATIENT)
Dept: ORTHOPEDIC SURGERY | Age: 84
End: 2024-10-07
Payer: COMMERCIAL

## 2024-10-07 ENCOUNTER — TELEPHONE (OUTPATIENT)
Dept: ORTHOPEDIC SURGERY | Age: 84
End: 2024-10-07

## 2024-10-07 VITALS — HEIGHT: 66 IN | WEIGHT: 179 LBS | BODY MASS INDEX: 28.77 KG/M2

## 2024-10-07 DIAGNOSIS — M25.511 RIGHT SHOULDER PAIN, UNSPECIFIED CHRONICITY: ICD-10-CM

## 2024-10-07 DIAGNOSIS — M75.81 ROTATOR CUFF TENDINITIS, RIGHT: Primary | ICD-10-CM

## 2024-10-07 PROCEDURE — 99214 OFFICE O/P EST MOD 30 MIN: CPT

## 2024-10-07 PROCEDURE — 1124F ACP DISCUSS-NO DSCNMKR DOCD: CPT

## 2024-10-07 PROCEDURE — 20610 DRAIN/INJ JOINT/BURSA W/O US: CPT

## 2024-10-07 RX ORDER — LIDOCAINE HYDROCHLORIDE 10 MG/ML
3 INJECTION, SOLUTION INFILTRATION; PERINEURAL ONCE
Status: COMPLETED | OUTPATIENT
Start: 2024-10-07 | End: 2024-10-07

## 2024-10-07 RX ORDER — TRIAMCINOLONE ACETONIDE 40 MG/ML
40 INJECTION, SUSPENSION INTRA-ARTICULAR; INTRAMUSCULAR ONCE
Status: COMPLETED | OUTPATIENT
Start: 2024-10-07 | End: 2024-10-07

## 2024-10-07 RX ADMIN — LIDOCAINE HYDROCHLORIDE 3 ML: 10 INJECTION, SOLUTION INFILTRATION; PERINEURAL at 11:54

## 2024-10-07 RX ADMIN — TRIAMCINOLONE ACETONIDE 40 MG: 40 INJECTION, SUSPENSION INTRA-ARTICULAR; INTRAMUSCULAR at 11:55

## 2024-10-07 NOTE — TELEPHONE ENCOUNTER
Patient would like another Handicap Placard.    Patient stated that his last one was for 5 years.    Is it ok to renew Placard for 5 years?

## 2024-10-07 NOTE — PROGRESS NOTES
Orthopedic Shoulder Encounter Note     Chief complaint: Right shoulder pain    HPI: Gerard Etienne is a 84 y.o.  right-hand dominant male who presents for evaluation of his right shoulder.  Patient states he has been doing with pain into the right shoulder for about a year now.  Patient was last seen by Dr. Daugherty on 2/5/2024 and was diagnosed with what appeared to be a proximal biceps tendinitis and underwent treatment with a an ultrasound-guided cortisone injection into the biceps tendon sheath.  Patient presents today stating that the injection did not provide any relief and he also received an injection by his PCP in December 2023 and states that that injection also did not provide any relief for him.  States that his pain today is mostly over the lateral aspect of his shoulder and also endorses a \"lump\" over the anterior aspect of his upper arm.  States that he was using a screwdriver to take a part of fence a couple of months ago and states that this is when he thinks he noticed the bump over his upper arm.  States that his pain is not present at rest but it does get worse with certain activities and overhead motions.  States that he feels like the pain comes when he is \"overdoing it\".  States he is taking Tylenol as needed for pain control.  Has not ever attended formal physical therapy for this issue.  Denies any numbness or tingling.    Previous treatment:    NSAIDs: None; has tried Tylenol as needed    Physical Therapy: None    Injections: Right shoulder cortisone by PCP on 12/2023.  Ultrasound-guided biceps tendon sheath injection in 2/2024.  No relief from either injection.    Surgeries: None    Review of Systems:   Constitutional: Negative for fever, chills, sweats.   Pain Score:   5  Neurological: Negative for headache, numbness, or weakness.   Musculoskeletal: As noted in HPI     Past Medical History  Gerard  has a past medical history of Diabetes (HCC), Edema, Essential hypertension, benign, Gout,

## 2024-10-09 NOTE — TELEPHONE ENCOUNTER
Handicap letter done. PC to pt, his daughter picked up and she will have him come get it, It is in the drawer for pt

## 2025-06-26 ENCOUNTER — PROCEDURE VISIT (OUTPATIENT)
Dept: ORTHOPEDIC SURGERY | Age: 85
End: 2025-06-26

## 2025-06-26 VITALS — WEIGHT: 182 LBS | HEIGHT: 65 IN | RESPIRATION RATE: 14 BRPM | BODY MASS INDEX: 30.32 KG/M2

## 2025-06-26 DIAGNOSIS — M75.81 ROTATOR CUFF TENDINITIS, RIGHT: Primary | ICD-10-CM

## 2025-06-26 DIAGNOSIS — M25.511 RIGHT SHOULDER PAIN, UNSPECIFIED CHRONICITY: ICD-10-CM

## 2025-06-26 RX ORDER — LIDOCAINE HYDROCHLORIDE 10 MG/ML
3 INJECTION, SOLUTION INFILTRATION; PERINEURAL ONCE
Status: COMPLETED | OUTPATIENT
Start: 2025-06-26 | End: 2025-06-26

## 2025-06-26 RX ORDER — TRIAMCINOLONE ACETONIDE 40 MG/ML
40 INJECTION, SUSPENSION INTRA-ARTICULAR; INTRAMUSCULAR ONCE
Status: COMPLETED | OUTPATIENT
Start: 2025-06-26 | End: 2025-06-26

## 2025-06-26 RX ADMIN — TRIAMCINOLONE ACETONIDE 40 MG: 40 INJECTION, SUSPENSION INTRA-ARTICULAR; INTRAMUSCULAR at 10:01

## 2025-06-26 RX ADMIN — LIDOCAINE HYDROCHLORIDE 3 ML: 10 INJECTION, SOLUTION INFILTRATION; PERINEURAL at 10:01

## 2025-06-26 NOTE — PROGRESS NOTES
ORTHOPEDIC PATIENT EVALUATION - Follow-Up      HPI / Chief Complaint  Gerard Etienne is a 85 y.o. male who presents for follow-up evaluation of his right shoulder.  I last saw the patient in my clinic approximately 8 months ago and diagnosed him with that appear to be a rotator cuff etiology specifically a rotator cuff tendinitis.  I did administer a subacromial space cortisone injection at that time and states that it worked up until about 6 weeks ago very effectively for him.  States that the pain did start to return without any new injury or trauma.  States he has been taking Tylenol Extra Strength.  Denies any new symptoms.  Denies any numbness or tingling or constitutional symptoms at this time.  States that it hurts to reach away from his body and mostly over the anterior aspect of the shoulder.    Past Medical History  Gerard  has a past medical history of Diabetes (HCC), Edema, Essential hypertension, benign, Gout, Heart burn, Herpes zoster without mention of complication, Hypertension, Neoplasm of uncertain behavior of prostate, Osteoarthritis, Pain in joint, lower leg, Psychogenic pain, site unspecified, Sprain of ribs, Type 2 diabetes mellitus (HCC), and Ventral hernia, unspecified, without mention of obstruction or gangrene.    Past Surgical History  Gerard  has a past surgical history that includes hernia repair; Knee arthroscopy; Cardiac valve replacement (01/05/2017); Cardiac surgery (01/05/2017); Vein Surgery (Left); Knee arthroscopy (Bilateral); Prostate surgery; hernia repair (Bilateral); ERCP (N/A, 06/15/2023); Total knee arthroplasty (Right); and Cholecystectomy, laparoscopic (N/A, 6/16/2023).    Current Medications  Current Outpatient Medications   Medication Sig Dispense Refill    cyclobenzaprine (FLEXERIL) 10 MG tablet Take 1 tablet by mouth every 8 hours as needed      metoprolol tartrate (LOPRESSOR) 25 MG tablet Take 0.5 tablets by mouth 2 times daily (Patient taking differently: Take 1 tablet

## (undated) DEVICE — ST CHARLES GEN LAPAROSCOPY PK: Brand: MEDLINE INDUSTRIES, INC.

## (undated) DEVICE — SUTURE MCRYL + SZ 4-0 L27IN ABSRB UD L19MM PS-2 3/8 CIR MCP426H

## (undated) DEVICE — TROCAR: Brand: KII FIOS FIRST ENTRY

## (undated) DEVICE — RETRIEVAL BALLOON CATHETER: Brand: EXTRACTOR™ PRO RX

## (undated) DEVICE — BLADELESS OBTURATOR: Brand: WECK VISTA

## (undated) DEVICE — SOLUTION IRRIG 1000ML STRL H2O USP PLAS POUR BTL

## (undated) DEVICE — SUCTION IRRIGATOR: Brand: ENDOWRIST

## (undated) DEVICE — GLOVE ORTHO 7 1/2   MSG9475

## (undated) DEVICE — GOWN,AURORA,NONREINFORCED,LARGE: Brand: MEDLINE

## (undated) DEVICE — SOLUTION IV 1000ML 0.9% SOD CHL PH 5 INJ USP VIAFLX PLAS

## (undated) DEVICE — GLOVE ORANGE PI 7   MSG9070

## (undated) DEVICE — DRESSING TRNSPAR W2XL2.75IN FLM SHT SEMIPERMEABLE WIND

## (undated) DEVICE — SPONGE GZ W2XL2IN NONWOVEN 4 PLY FASTER WICKING ABIL AVANT

## (undated) DEVICE — SPONGE DRN W4XL4IN RAYON/POLYESTER 6 PLY NONWOVEN PRECUT 2 PER PK

## (undated) DEVICE — SYSTEM BX CAP BILI RAP EXCHG CAP LOK DEV COMPATIBLE W/ OLY

## (undated) DEVICE — ENDO KIT W/SYRINGE: Brand: MEDLINE INDUSTRIES, INC.

## (undated) DEVICE — SYRINGE MED 10ML LUERLOCK TIP W/O SFTY DISP

## (undated) DEVICE — ARM DRAPE

## (undated) DEVICE — BITEBLOCK 54FR W/ DENT RIM BLOX

## (undated) DEVICE — SUTURE PERMAHAND SZ 0 L30IN NONABSORBABLE BLK FSL L30MM 3/8 680H

## (undated) DEVICE — STRIP,CLOSURE,WOUND,MEDI-STRIP,1/2X4: Brand: MEDLINE

## (undated) DEVICE — SINGLE USE DISTAL COVER MAJ-2315: Brand: SINGLE USE DISTAL COVER

## (undated) DEVICE — COVER,MAYO STAND,XL,STERILE: Brand: MEDLINE

## (undated) DEVICE — DEFENDO AIR WATER SUCTION AND BIOPSY VALVE KIT FOR  OLYMPUS: Brand: DEFENDO AIR/WATER/SUCTION AND BIOPSY VALVE

## (undated) DEVICE — KIT DRN FLAT W/ 100CC EVAC 10MM FULL PERF

## (undated) DEVICE — SUTURE PDS II SZ 0 L27IN ABSRB VLT UR-6 L26MM 1/2 CIR D7185

## (undated) DEVICE — TROCARS: Brand: KII® BALLOON BLUNT TIP SYSTEM

## (undated) DEVICE — BLANKET WRM W29.9XL79.1IN UP BODY FORC AIR MISTRAL-AIR

## (undated) DEVICE — COVER,TABLE,60X90,STERILE: Brand: MEDLINE

## (undated) DEVICE — SYRINGE MED 3ML CLR PLAS STD N CTRL LUERLOCK TIP DISP

## (undated) DEVICE — SPHINCTEROTOME: Brand: DREAMTOME™ RX 44

## (undated) DEVICE — SOLUTION IRRIG 1000ML 0.9% SOD CHL USP POUR PLAS BTL

## (undated) DEVICE — MERCY HEALTH ST CHARLES: Brand: MEDLINE INDUSTRIES, INC.

## (undated) DEVICE — CANNULA SEAL

## (undated) DEVICE — LIQUIBAND RAPID ADHESIVE 36/CS 0.8ML: Brand: MEDLINE

## (undated) DEVICE — SOLUTION ANTIFOG VIS SYS CLEARIFY LAPSCP

## (undated) DEVICE — SYRINGE MED 20ML STD CLR PLAS LUERSLIP TIP N CTRL DISP